# Patient Record
Sex: MALE | Race: WHITE | NOT HISPANIC OR LATINO | Employment: OTHER | URBAN - NONMETROPOLITAN AREA
[De-identification: names, ages, dates, MRNs, and addresses within clinical notes are randomized per-mention and may not be internally consistent; named-entity substitution may affect disease eponyms.]

---

## 2019-05-05 ENCOUNTER — APPOINTMENT (OUTPATIENT)
Dept: ULTRASOUND IMAGING | Facility: HOSPITAL | Age: 64
End: 2019-05-05

## 2019-05-05 ENCOUNTER — APPOINTMENT (OUTPATIENT)
Dept: GENERAL RADIOLOGY | Facility: HOSPITAL | Age: 64
End: 2019-05-05

## 2019-05-05 ENCOUNTER — HOSPITAL ENCOUNTER (EMERGENCY)
Facility: HOSPITAL | Age: 64
Discharge: HOME | End: 2019-05-05
Attending: EMERGENCY MEDICINE | Admitting: EMERGENCY MEDICINE

## 2019-05-05 ENCOUNTER — APPOINTMENT (OUTPATIENT)
Dept: CT IMAGING | Facility: HOSPITAL | Age: 64
End: 2019-05-05

## 2019-05-05 VITALS
OXYGEN SATURATION: 99 % | DIASTOLIC BLOOD PRESSURE: 91 MMHG | RESPIRATION RATE: 18 BRPM | HEIGHT: 67 IN | WEIGHT: 140 LBS | HEART RATE: 90 BPM | SYSTOLIC BLOOD PRESSURE: 127 MMHG | BODY MASS INDEX: 21.97 KG/M2 | TEMPERATURE: 97.8 F

## 2019-05-05 DIAGNOSIS — R19.7 DIARRHEA, UNSPECIFIED TYPE: Primary | ICD-10-CM

## 2019-05-05 DIAGNOSIS — M54.50 BILATERAL LOW BACK PAIN WITHOUT SCIATICA, UNSPECIFIED CHRONICITY: ICD-10-CM

## 2019-05-05 DIAGNOSIS — M79.604 ACUTE LEG PAIN, RIGHT: ICD-10-CM

## 2019-05-05 LAB
ALBUMIN SERPL-MCNC: 4.25 G/DL (ref 3.5–5.2)
ALBUMIN/GLOB SERPL: 1.4 G/DL
ALP SERPL-CCNC: 179 U/L (ref 39–117)
ALT SERPL W P-5'-P-CCNC: 20 U/L (ref 1–41)
ANION GAP SERPL CALCULATED.3IONS-SCNC: 15.3 MMOL/L
AST SERPL-CCNC: 18 U/L (ref 1–40)
BASOPHILS # BLD AUTO: 0.03 10*3/MM3 (ref 0–0.2)
BASOPHILS NFR BLD AUTO: 0.3 % (ref 0–1.5)
BILIRUB SERPL-MCNC: 0.9 MG/DL (ref 0.2–1.2)
BUN BLD-MCNC: 16 MG/DL (ref 8–23)
BUN/CREAT SERPL: 17.6 (ref 7–25)
CALCIUM SPEC-SCNC: 9.7 MG/DL (ref 8.6–10.5)
CHLORIDE SERPL-SCNC: 101 MMOL/L (ref 98–107)
CO2 SERPL-SCNC: 25.7 MMOL/L (ref 22–29)
CREAT BLD-MCNC: 0.91 MG/DL (ref 0.76–1.27)
DEPRECATED RDW RBC AUTO: 42.1 FL (ref 37–54)
EOSINOPHIL # BLD AUTO: 0.18 10*3/MM3 (ref 0–0.4)
EOSINOPHIL NFR BLD AUTO: 1.7 % (ref 0.3–6.2)
ERYTHROCYTE [DISTWIDTH] IN BLOOD BY AUTOMATED COUNT: 14.6 % (ref 12.3–15.4)
GFR SERPL CREATININE-BSD FRML MDRD: 84 ML/MIN/1.73
GLOBULIN UR ELPH-MCNC: 3 GM/DL
GLUCOSE BLD-MCNC: 150 MG/DL (ref 65–99)
HCT VFR BLD AUTO: 43.2 % (ref 37.5–51)
HGB BLD-MCNC: 14.6 G/DL (ref 13–17.7)
HOLD SPECIMEN: NORMAL
HOLD SPECIMEN: NORMAL
IMM GRANULOCYTES # BLD AUTO: 0.04 10*3/MM3 (ref 0–0.05)
IMM GRANULOCYTES NFR BLD AUTO: 0.4 % (ref 0–0.5)
LYMPHOCYTES # BLD AUTO: 1.18 10*3/MM3 (ref 0.7–3.1)
LYMPHOCYTES NFR BLD AUTO: 11.4 % (ref 19.6–45.3)
MCH RBC QN AUTO: 27.7 PG (ref 26.6–33)
MCHC RBC AUTO-ENTMCNC: 33.8 G/DL (ref 31.5–35.7)
MCV RBC AUTO: 81.8 FL (ref 79–97)
MONOCYTES # BLD AUTO: 0.68 10*3/MM3 (ref 0.1–0.9)
MONOCYTES NFR BLD AUTO: 6.6 % (ref 5–12)
NEUTROPHILS # BLD AUTO: 8.21 10*3/MM3 (ref 1.7–7)
NEUTROPHILS NFR BLD AUTO: 79.6 % (ref 42.7–76)
PLATELET # BLD AUTO: 147 10*3/MM3 (ref 140–450)
PMV BLD AUTO: 11.4 FL (ref 6–12)
POTASSIUM BLD-SCNC: 4 MMOL/L (ref 3.5–5.2)
PROT SERPL-MCNC: 7.2 G/DL (ref 6–8.5)
RBC # BLD AUTO: 5.28 10*6/MM3 (ref 4.14–5.8)
SODIUM BLD-SCNC: 142 MMOL/L (ref 136–145)
TROPONIN T SERPL-MCNC: <0.01 NG/ML (ref 0–0.03)
WBC NRBC COR # BLD: 10.32 10*3/MM3 (ref 3.4–10.8)
WHOLE BLOOD HOLD SPECIMEN: NORMAL
WHOLE BLOOD HOLD SPECIMEN: NORMAL

## 2019-05-05 PROCEDURE — 85025 COMPLETE CBC W/AUTO DIFF WBC: CPT | Performed by: PHYSICIAN ASSISTANT

## 2019-05-05 PROCEDURE — 99284 EMERGENCY DEPT VISIT MOD MDM: CPT

## 2019-05-05 PROCEDURE — 93971 EXTREMITY STUDY: CPT | Performed by: RADIOLOGY

## 2019-05-05 PROCEDURE — 71046 X-RAY EXAM CHEST 2 VIEWS: CPT

## 2019-05-05 PROCEDURE — 71046 X-RAY EXAM CHEST 2 VIEWS: CPT | Performed by: RADIOLOGY

## 2019-05-05 PROCEDURE — 93971 EXTREMITY STUDY: CPT

## 2019-05-05 PROCEDURE — 74177 CT ABD & PELVIS W/CONTRAST: CPT

## 2019-05-05 PROCEDURE — 93926 LOWER EXTREMITY STUDY: CPT | Performed by: RADIOLOGY

## 2019-05-05 PROCEDURE — 96360 HYDRATION IV INFUSION INIT: CPT

## 2019-05-05 PROCEDURE — 93926 LOWER EXTREMITY STUDY: CPT

## 2019-05-05 PROCEDURE — 80053 COMPREHEN METABOLIC PANEL: CPT | Performed by: PHYSICIAN ASSISTANT

## 2019-05-05 PROCEDURE — 74177 CT ABD & PELVIS W/CONTRAST: CPT | Performed by: RADIOLOGY

## 2019-05-05 PROCEDURE — 93010 ELECTROCARDIOGRAM REPORT: CPT | Performed by: INTERNAL MEDICINE

## 2019-05-05 PROCEDURE — 0 IOVERSOL 68 % SOLUTION: Performed by: EMERGENCY MEDICINE

## 2019-05-05 PROCEDURE — 93005 ELECTROCARDIOGRAM TRACING: CPT | Performed by: PHYSICIAN ASSISTANT

## 2019-05-05 PROCEDURE — 84484 ASSAY OF TROPONIN QUANT: CPT | Performed by: PHYSICIAN ASSISTANT

## 2019-05-05 RX ORDER — CARVEDILOL 25 MG/1
25 TABLET ORAL 2 TIMES DAILY WITH MEALS
Status: ON HOLD | COMMUNITY
End: 2019-07-10

## 2019-05-05 RX ORDER — GLYBURIDE 2.5 MG/1
2.5 TABLET ORAL
Status: ON HOLD | COMMUNITY
End: 2019-07-10

## 2019-05-05 RX ORDER — LISINOPRIL 20 MG/1
20 TABLET ORAL DAILY
Status: ON HOLD | COMMUNITY
End: 2019-07-10

## 2019-05-05 RX ORDER — CETIRIZINE HYDROCHLORIDE 10 MG/1
10 TABLET ORAL DAILY
Status: ON HOLD | COMMUNITY
End: 2019-07-10

## 2019-05-05 RX ORDER — FUROSEMIDE 20 MG/1
20 TABLET ORAL 2 TIMES DAILY
Status: ON HOLD | COMMUNITY
End: 2019-07-10

## 2019-05-05 RX ORDER — SODIUM CHLORIDE 0.9 % (FLUSH) 0.9 %
10 SYRINGE (ML) INJECTION AS NEEDED
Status: DISCONTINUED | OUTPATIENT
Start: 2019-05-05 | End: 2019-05-05 | Stop reason: HOSPADM

## 2019-05-05 RX ORDER — SPIRONOLACTONE 25 MG/1
12.5 TABLET ORAL DAILY
Status: ON HOLD | COMMUNITY
End: 2019-07-10

## 2019-05-05 RX ORDER — ASPIRIN 81 MG/1
81 TABLET, CHEWABLE ORAL DAILY
Status: ON HOLD | COMMUNITY
End: 2019-07-10

## 2019-05-05 RX ORDER — ONDANSETRON 4 MG/1
4 TABLET, ORALLY DISINTEGRATING ORAL EVERY 6 HOURS PRN
Qty: 20 TABLET | Refills: 0 | Status: ON HOLD | OUTPATIENT
Start: 2019-05-05 | End: 2019-07-10

## 2019-05-05 RX ORDER — ATORVASTATIN CALCIUM 40 MG/1
20 TABLET, FILM COATED ORAL DAILY
Status: ON HOLD | COMMUNITY
End: 2019-07-10

## 2019-05-05 RX ORDER — ISOSORBIDE MONONITRATE 30 MG/1
30 TABLET, EXTENDED RELEASE ORAL DAILY
Status: ON HOLD | COMMUNITY
End: 2019-07-10

## 2019-05-05 RX ORDER — PANTOPRAZOLE SODIUM 40 MG/1
40 TABLET, DELAYED RELEASE ORAL DAILY
Status: ON HOLD | COMMUNITY
End: 2019-07-10

## 2019-05-05 RX ORDER — NITROGLYCERIN 0.4 MG/1
0.4 TABLET SUBLINGUAL
Status: ON HOLD | COMMUNITY
End: 2019-07-10

## 2019-05-05 RX ADMIN — IOVERSOL 100 ML: 678 INJECTION INTRA-ARTERIAL; INTRAVENOUS at 15:05

## 2019-05-05 RX ADMIN — SODIUM CHLORIDE 1000 ML: 9 INJECTION, SOLUTION INTRAVENOUS at 13:03

## 2019-05-05 NOTE — ED NOTES
Pt felt he was going to have bowel movement put was unable to go. Provider aware.     Kathrin Wilson, RN  05/05/19 1600

## 2019-05-05 NOTE — ED NOTES
Called to request medical records from [facility].     Kathrin Wilson, RN  05/05/19 1372

## 2019-05-05 NOTE — ED PROVIDER NOTES
Subjective   This is a 63-year-old male who comes in with multiple complaints from home.  Patient presents complaining of weakness, fatigue, SOA, lumbar back pain, abdominal pain and diarrhea.  Patient also reports having right lower extremity pain.  Patient does state he has had several episodes of runny diarrhea over the past week.  Has had intermittent sharp, stabbing lumbar back pain that radiates to his lower abdomen.  Patient does have significant history of CHF, hypertension, cardiac disease with 5 previous cardiac stents placed, pneumonia.        History provided by:  Patient   used: No    Back Pain   Location:  Lumbar spine  Quality:  Aching  Pain severity:  No pain  Onset quality:  Sudden  Duration:  1 day  Timing:  Intermittent  Progression:  Worsening  Chronicity:  New  Relieved by:  Nothing  Worsened by:  Nothing  Ineffective treatments:  None tried  Associated symptoms: abdominal pain, leg pain and weakness    Associated symptoms: no bladder incontinence, no bowel incontinence, no dysuria and no perianal numbness    Risk factors: no hx of cancer, no hx of osteoporosis, no menopause, not obese, no recent surgery and no steroid use    Diarrhea   The primary symptoms include abdominal pain, nausea and diarrhea. Primary symptoms do not include dysuria or myalgias.   The illness is also significant for back pain. The illness does not include chills, anorexia, odynophagia or bloating. Associated medical issues do not include GERD, gallstones, alcohol abuse, gastric bypass, bowel resection, irritable bowel syndrome or hemorrhoids.       Review of Systems   Constitutional: Negative for chills.   Respiratory: Negative.    Gastrointestinal: Positive for abdominal distention, abdominal pain, diarrhea and nausea. Negative for anorexia, bloating and bowel incontinence.   Genitourinary: Negative.  Negative for bladder incontinence, difficulty urinating, dysuria, enuresis, flank pain and genital  sores.   Musculoskeletal: Positive for back pain. Negative for gait problem, joint swelling and myalgias.   Neurological: Positive for weakness.   Hematological: Negative for adenopathy. Does not bruise/bleed easily.   All other systems reviewed and are negative.      Past Medical History:   Diagnosis Date   • CHF (congestive heart failure) (CMS/HCC)    • GERD (gastroesophageal reflux disease)    • Hyperlipidemia    • Hypertension    • Myocardial infarction (CMS/HCC)    • Pneumonia        No Known Allergies    Past Surgical History:   Procedure Laterality Date   • CARDIAC CATHETERIZATION     • FOOT SURGERY     • PACEMAKER IMPLANTATION     • SHOULDER SURGERY         History reviewed. No pertinent family history.    Social History     Socioeconomic History   • Marital status:      Spouse name: Not on file   • Number of children: Not on file   • Years of education: Not on file   • Highest education level: Not on file   Tobacco Use   • Smoking status: Never Smoker   • Smokeless tobacco: Never Used   Substance and Sexual Activity   • Alcohol use: No     Frequency: Never   • Drug use: No           Objective   Physical Exam   Constitutional: He is oriented to person, place, and time. He appears well-developed and well-nourished. No distress.   HENT:   Head: Normocephalic.   Right Ear: External ear normal.   Left Ear: External ear normal.   Nose: Nose normal.   Mouth/Throat: Oropharynx is clear and moist. No oropharyngeal exudate.   Eyes: Conjunctivae and EOM are normal. Pupils are equal, round, and reactive to light. Right eye exhibits no discharge. Left eye exhibits no discharge. No scleral icterus.   Neck: Normal range of motion. Neck supple. No JVD present. No tracheal deviation present. No thyromegaly present.   Cardiovascular: Normal rate, regular rhythm, normal heart sounds and intact distal pulses. Exam reveals no gallop and no friction rub.   No murmur heard.  Pulmonary/Chest: Effort normal and breath sounds  normal. No stridor. No respiratory distress. He has no wheezes. He has no rales. He exhibits no tenderness.   Abdominal: Soft. Bowel sounds are normal. He exhibits no distension and no mass. There is no tenderness. There is no rebound and no guarding.   Musculoskeletal: Normal range of motion. He exhibits no edema, tenderness or deformity.   Lymphadenopathy:     He has no cervical adenopathy.   Neurological: He is alert and oriented to person, place, and time. He displays normal reflexes. No cranial nerve deficit or sensory deficit. He exhibits normal muscle tone. Coordination normal.   Skin: Skin is warm and dry. Capillary refill takes less than 2 seconds. No rash noted. He is not diaphoretic. No erythema. No pallor.   Psychiatric: He has a normal mood and affect. His behavior is normal. Judgment and thought content normal.   Nursing note and vitals reviewed.      Procedures           ED Course                  MDM      Final diagnoses:   Diarrhea, unspecified type   Bilateral low back pain without sciatica, unspecified chronicity   Acute leg pain, right            Mark Campuzano PA-C  05/05/19 5916

## 2019-07-10 ENCOUNTER — APPOINTMENT (OUTPATIENT)
Dept: GENERAL RADIOLOGY | Facility: HOSPITAL | Age: 64
End: 2019-07-10

## 2019-07-10 ENCOUNTER — HOSPITAL ENCOUNTER (OUTPATIENT)
Facility: HOSPITAL | Age: 64
Setting detail: OBSERVATION
Discharge: HOME | End: 2019-07-12
Attending: EMERGENCY MEDICINE | Admitting: INTERNAL MEDICINE

## 2019-07-10 DIAGNOSIS — R07.9 CHEST PAIN, UNSPECIFIED TYPE: Primary | ICD-10-CM

## 2019-07-10 DIAGNOSIS — K59.00 CONSTIPATION, UNSPECIFIED CONSTIPATION TYPE: ICD-10-CM

## 2019-07-10 DIAGNOSIS — K56.41 FECAL IMPACTION (HCC): ICD-10-CM

## 2019-07-10 DIAGNOSIS — R53.81 DEBILITY: ICD-10-CM

## 2019-07-10 LAB
6-ACETYL MORPHINE: NEGATIVE
ALBUMIN SERPL-MCNC: 3.34 G/DL (ref 3.5–5.2)
ALBUMIN/GLOB SERPL: 1.1 G/DL
ALP SERPL-CCNC: 101 U/L (ref 39–117)
ALT SERPL W P-5'-P-CCNC: 7 U/L (ref 1–41)
AMPHET+METHAMPHET UR QL: NEGATIVE
ANION GAP SERPL CALCULATED.3IONS-SCNC: 15.5 MMOL/L (ref 5–15)
AST SERPL-CCNC: 16 U/L (ref 1–40)
BARBITURATES UR QL SCN: NEGATIVE
BASOPHILS # BLD AUTO: 0.05 10*3/MM3 (ref 0–0.2)
BASOPHILS NFR BLD AUTO: 0.5 % (ref 0–1.5)
BENZODIAZ UR QL SCN: NEGATIVE
BILIRUB SERPL-MCNC: 0.9 MG/DL (ref 0.2–1.2)
BILIRUB UR QL STRIP: ABNORMAL
BUN BLD-MCNC: 14 MG/DL (ref 8–23)
BUN/CREAT SERPL: 18.7 (ref 7–25)
BUPRENORPHINE SERPL-MCNC: NEGATIVE NG/ML
CALCIUM SPEC-SCNC: 9.2 MG/DL (ref 8.6–10.5)
CANNABINOIDS SERPL QL: NEGATIVE
CHLORIDE SERPL-SCNC: 105 MMOL/L (ref 98–107)
CLARITY UR: CLEAR
CO2 SERPL-SCNC: 23.5 MMOL/L (ref 22–29)
COCAINE UR QL: NEGATIVE
COLOR UR: ABNORMAL
CREAT BLD-MCNC: 0.75 MG/DL (ref 0.76–1.27)
DEPRECATED RDW RBC AUTO: 47.6 FL (ref 37–54)
EOSINOPHIL # BLD AUTO: 0.07 10*3/MM3 (ref 0–0.4)
EOSINOPHIL NFR BLD AUTO: 0.8 % (ref 0.3–6.2)
ERYTHROCYTE [DISTWIDTH] IN BLOOD BY AUTOMATED COUNT: 15.2 % (ref 12.3–15.4)
GFR SERPL CREATININE-BSD FRML MDRD: 105 ML/MIN/1.73
GLOBULIN UR ELPH-MCNC: 3.1 GM/DL
GLUCOSE BLD-MCNC: 81 MG/DL (ref 65–99)
GLUCOSE BLDC GLUCOMTR-MCNC: 55 MG/DL (ref 70–130)
GLUCOSE BLDC GLUCOMTR-MCNC: 86 MG/DL (ref 70–130)
GLUCOSE UR STRIP-MCNC: NEGATIVE MG/DL
HBA1C MFR BLD: 5.2 % (ref 4.8–5.6)
HCT VFR BLD AUTO: 40.3 % (ref 37.5–51)
HGB BLD-MCNC: 12.9 G/DL (ref 13–17.7)
HGB UR QL STRIP.AUTO: NEGATIVE
IMM GRANULOCYTES # BLD AUTO: 0.03 10*3/MM3 (ref 0–0.05)
IMM GRANULOCYTES NFR BLD AUTO: 0.3 % (ref 0–0.5)
KETONES UR QL STRIP: ABNORMAL
LEUKOCYTE ESTERASE UR QL STRIP.AUTO: NEGATIVE
LIPASE SERPL-CCNC: 47 U/L (ref 13–60)
LYMPHOCYTES # BLD AUTO: 1.18 10*3/MM3 (ref 0.7–3.1)
LYMPHOCYTES NFR BLD AUTO: 13 % (ref 19.6–45.3)
MAGNESIUM SERPL-MCNC: 1.8 MG/DL (ref 1.6–2.4)
MCH RBC QN AUTO: 27.7 PG (ref 26.6–33)
MCHC RBC AUTO-ENTMCNC: 32 G/DL (ref 31.5–35.7)
MCV RBC AUTO: 86.5 FL (ref 79–97)
METHADONE UR QL SCN: NEGATIVE
MONOCYTES # BLD AUTO: 0.56 10*3/MM3 (ref 0.1–0.9)
MONOCYTES NFR BLD AUTO: 6.2 % (ref 5–12)
NEUTROPHILS # BLD AUTO: 7.21 10*3/MM3 (ref 1.7–7)
NEUTROPHILS NFR BLD AUTO: 79.2 % (ref 42.7–76)
NITRITE UR QL STRIP: NEGATIVE
NT-PROBNP SERPL-MCNC: 2289 PG/ML (ref 5–900)
OPIATES UR QL: NEGATIVE
OXYCODONE UR QL SCN: NEGATIVE
PCP UR QL SCN: NEGATIVE
PH UR STRIP.AUTO: <=5 [PH] (ref 5–8)
PLATELET # BLD AUTO: 233 10*3/MM3 (ref 140–450)
PMV BLD AUTO: 10.9 FL (ref 6–12)
POTASSIUM BLD-SCNC: 3.3 MMOL/L (ref 3.5–5.2)
PROT SERPL-MCNC: 6.4 G/DL (ref 6–8.5)
PROT UR QL STRIP: NEGATIVE
RBC # BLD AUTO: 4.66 10*6/MM3 (ref 4.14–5.8)
SODIUM BLD-SCNC: 144 MMOL/L (ref 136–145)
SP GR UR STRIP: 1.02 (ref 1–1.03)
TROPONIN T SERPL-MCNC: <0.01 NG/ML (ref 0–0.03)
TROPONIN T SERPL-MCNC: <0.01 NG/ML (ref 0–0.03)
TSH SERPL DL<=0.05 MIU/L-ACNC: 0.96 MIU/ML (ref 0.27–4.2)
UROBILINOGEN UR QL STRIP: ABNORMAL
WBC NRBC COR # BLD: 9.1 10*3/MM3 (ref 3.4–10.8)

## 2019-07-10 PROCEDURE — 85025 COMPLETE CBC W/AUTO DIFF WBC: CPT | Performed by: PHYSICIAN ASSISTANT

## 2019-07-10 PROCEDURE — 74022 RADEX COMPL AQT ABD SERIES: CPT

## 2019-07-10 PROCEDURE — 99219 PR INITIAL OBSERVATION CARE/DAY 50 MINUTES: CPT | Performed by: PHYSICIAN ASSISTANT

## 2019-07-10 PROCEDURE — 82962 GLUCOSE BLOOD TEST: CPT

## 2019-07-10 PROCEDURE — 80307 DRUG TEST PRSMV CHEM ANLYZR: CPT | Performed by: PHYSICIAN ASSISTANT

## 2019-07-10 PROCEDURE — 99285 EMERGENCY DEPT VISIT HI MDM: CPT

## 2019-07-10 PROCEDURE — 83690 ASSAY OF LIPASE: CPT | Performed by: PHYSICIAN ASSISTANT

## 2019-07-10 PROCEDURE — 74022 RADEX COMPL AQT ABD SERIES: CPT | Performed by: RADIOLOGY

## 2019-07-10 PROCEDURE — 80053 COMPREHEN METABOLIC PANEL: CPT | Performed by: PHYSICIAN ASSISTANT

## 2019-07-10 PROCEDURE — 81003 URINALYSIS AUTO W/O SCOPE: CPT | Performed by: PHYSICIAN ASSISTANT

## 2019-07-10 PROCEDURE — 84443 ASSAY THYROID STIM HORMONE: CPT | Performed by: PHYSICIAN ASSISTANT

## 2019-07-10 PROCEDURE — 84484 ASSAY OF TROPONIN QUANT: CPT | Performed by: PHYSICIAN ASSISTANT

## 2019-07-10 PROCEDURE — 83880 ASSAY OF NATRIURETIC PEPTIDE: CPT | Performed by: PHYSICIAN ASSISTANT

## 2019-07-10 PROCEDURE — 93005 ELECTROCARDIOGRAM TRACING: CPT | Performed by: PHYSICIAN ASSISTANT

## 2019-07-10 PROCEDURE — 93010 ELECTROCARDIOGRAM REPORT: CPT | Performed by: INTERNAL MEDICINE

## 2019-07-10 PROCEDURE — G0378 HOSPITAL OBSERVATION PER HR: HCPCS

## 2019-07-10 PROCEDURE — 83036 HEMOGLOBIN GLYCOSYLATED A1C: CPT | Performed by: PHYSICIAN ASSISTANT

## 2019-07-10 PROCEDURE — 83735 ASSAY OF MAGNESIUM: CPT | Performed by: PHYSICIAN ASSISTANT

## 2019-07-10 RX ORDER — SODIUM CHLORIDE 9 MG/ML
50 INJECTION, SOLUTION INTRAVENOUS CONTINUOUS
Status: DISCONTINUED | OUTPATIENT
Start: 2019-07-10 | End: 2019-07-12 | Stop reason: HOSPADM

## 2019-07-10 RX ORDER — HEPARIN SODIUM 5000 [USP'U]/ML
5000 INJECTION, SOLUTION INTRAVENOUS; SUBCUTANEOUS EVERY 8 HOURS SCHEDULED
Status: CANCELLED | OUTPATIENT
Start: 2019-07-10

## 2019-07-10 RX ORDER — POTASSIUM CHLORIDE 20 MEQ/1
40 TABLET, EXTENDED RELEASE ORAL EVERY 4 HOURS
Status: COMPLETED | OUTPATIENT
Start: 2019-07-10 | End: 2019-07-10

## 2019-07-10 RX ORDER — CARVEDILOL 3.12 MG/1
3.12 TABLET ORAL 2 TIMES DAILY WITH MEALS
COMMUNITY
End: 2019-07-12 | Stop reason: HOSPADM

## 2019-07-10 RX ORDER — NICOTINE POLACRILEX 4 MG
15 LOZENGE BUCCAL
Status: DISCONTINUED | OUTPATIENT
Start: 2019-07-10 | End: 2019-07-12 | Stop reason: HOSPADM

## 2019-07-10 RX ORDER — POTASSIUM CHLORIDE 750 MG/1
40 CAPSULE, EXTENDED RELEASE ORAL AS NEEDED
Status: DISCONTINUED | OUTPATIENT
Start: 2019-07-10 | End: 2019-07-12 | Stop reason: HOSPADM

## 2019-07-10 RX ORDER — ASPIRIN 81 MG/1
324 TABLET, CHEWABLE ORAL ONCE
Status: COMPLETED | OUTPATIENT
Start: 2019-07-10 | End: 2019-07-10

## 2019-07-10 RX ORDER — LISINOPRIL 5 MG/1
5 TABLET ORAL DAILY
COMMUNITY
End: 2019-07-12 | Stop reason: HOSPADM

## 2019-07-10 RX ORDER — POTASSIUM CHLORIDE 1.5 G/1.77G
40 POWDER, FOR SOLUTION ORAL AS NEEDED
Status: DISCONTINUED | OUTPATIENT
Start: 2019-07-10 | End: 2019-07-12 | Stop reason: HOSPADM

## 2019-07-10 RX ORDER — DEXTROSE MONOHYDRATE 25 G/50ML
25 INJECTION, SOLUTION INTRAVENOUS
Status: DISCONTINUED | OUTPATIENT
Start: 2019-07-10 | End: 2019-07-12 | Stop reason: HOSPADM

## 2019-07-10 RX ORDER — SODIUM CHLORIDE 0.9 % (FLUSH) 0.9 %
10 SYRINGE (ML) INJECTION AS NEEDED
Status: DISCONTINUED | OUTPATIENT
Start: 2019-07-10 | End: 2019-07-12 | Stop reason: HOSPADM

## 2019-07-10 RX ORDER — SENNA AND DOCUSATE SODIUM 50; 8.6 MG/1; MG/1
2 TABLET, FILM COATED ORAL NIGHTLY
Status: DISCONTINUED | OUTPATIENT
Start: 2019-07-10 | End: 2019-07-12 | Stop reason: HOSPADM

## 2019-07-10 RX ORDER — BISACODYL 10 MG
10 SUPPOSITORY, RECTAL RECTAL DAILY PRN
Status: DISCONTINUED | OUTPATIENT
Start: 2019-07-10 | End: 2019-07-12 | Stop reason: HOSPADM

## 2019-07-10 RX ORDER — ATORVASTATIN CALCIUM 20 MG/1
20 TABLET, FILM COATED ORAL NIGHTLY
Status: DISCONTINUED | OUTPATIENT
Start: 2019-07-10 | End: 2019-07-12 | Stop reason: HOSPADM

## 2019-07-10 RX ORDER — SODIUM CHLORIDE 0.9 % (FLUSH) 0.9 %
3-10 SYRINGE (ML) INJECTION AS NEEDED
Status: DISCONTINUED | OUTPATIENT
Start: 2019-07-10 | End: 2019-07-12 | Stop reason: HOSPADM

## 2019-07-10 RX ORDER — SODIUM CHLORIDE 0.9 % (FLUSH) 0.9 %
3 SYRINGE (ML) INJECTION EVERY 12 HOURS SCHEDULED
Status: DISCONTINUED | OUTPATIENT
Start: 2019-07-10 | End: 2019-07-12 | Stop reason: HOSPADM

## 2019-07-10 RX ADMIN — SENNOSIDES AND DOCUSATE SODIUM 2 TABLET: 8.6; 5 TABLET ORAL at 18:43

## 2019-07-10 RX ADMIN — POTASSIUM CHLORIDE 40 MEQ: 1500 TABLET, EXTENDED RELEASE ORAL at 18:44

## 2019-07-10 RX ADMIN — SODIUM CHLORIDE 50 ML/HR: 9 INJECTION, SOLUTION INTRAVENOUS at 18:16

## 2019-07-10 RX ADMIN — POTASSIUM CHLORIDE 40 MEQ: 1500 TABLET, EXTENDED RELEASE ORAL at 21:35

## 2019-07-10 RX ADMIN — APIXABAN 5 MG: 5 TABLET, FILM COATED ORAL at 21:35

## 2019-07-10 RX ADMIN — POLYETHYLENE GLYCOL (3350) 17 G: 17 POWDER, FOR SOLUTION ORAL at 18:43

## 2019-07-10 RX ADMIN — ASPIRIN 324 MG: 81 TABLET, CHEWABLE ORAL at 15:55

## 2019-07-10 RX ADMIN — ATORVASTATIN CALCIUM 20 MG: 20 TABLET, FILM COATED ORAL at 21:35

## 2019-07-10 RX ADMIN — SODIUM CHLORIDE 500 ML: 9 INJECTION, SOLUTION INTRAVENOUS at 12:54

## 2019-07-10 NOTE — ED PROVIDER NOTES
"Subjective   63-year-old male patient presents to the emergency room today with complaints of hypotension, shortness of breath, abdominal pain, and constipation.  Patient states that Dr. Michealts him to the ER related to his systolic blood pressure being in the 80s at his office this morning.  Patient states that he has been constipated for over a week.  States that last week he was seen at [facility] ER and was told that he was \"impacted.\"  Patient was discharged home with enemas.  Patient states that he is still not had a bowel movement.  States that he has been trying to digitally disimpact himself at home but having no progress.  Patient states that he has not been able to eat or drink anything and has not been taking his medication due to the abdominal discomfort and constipation.  States that everything that he eats or drinks makes him sick.  He denies having any chest pain.  Denies having any fevers at home.  He denies having any nausea or vomiting.        History provided by:  Patient   used: No        Review of Systems   Constitutional: Negative.    HENT: Negative.    Eyes: Negative.    Respiratory: Positive for shortness of breath.    Cardiovascular: Negative.    Gastrointestinal: Positive for abdominal pain and constipation.   Endocrine: Negative.    Genitourinary: Negative.    Musculoskeletal: Negative.    Skin: Negative.    Allergic/Immunologic: Negative.    Neurological: Negative.    Hematological: Negative.    Psychiatric/Behavioral: Negative.    All other systems reviewed and are negative.      Past Medical History:   Diagnosis Date   • CHF (congestive heart failure) (CMS/HCC)    • GERD (gastroesophageal reflux disease)    • Hyperlipidemia    • Hypertension    • Myocardial infarction (CMS/[facility])    • Pneumonia        No Known Allergies    Past Surgical History:   Procedure Laterality Date   • CARDIAC CATHETERIZATION     • FOOT SURGERY     • PACEMAKER IMPLANTATION     • SHOULDER " SURGERY         No family history on file.    Social History     Socioeconomic History   • Marital status:      Spouse name: Not on file   • Number of children: Not on file   • Years of education: Not on file   • Highest education level: Not on file   Tobacco Use   • Smoking status: Never Smoker   • Smokeless tobacco: Never Used   Substance and Sexual Activity   • Alcohol use: No     Frequency: Never   • Drug use: No           Objective   Physical Exam   Constitutional: He is oriented to person, place, and time. He appears well-developed and well-nourished.   HENT:   Head: Normocephalic and atraumatic.   Right Ear: External ear normal.   Left Ear: External ear normal.   Nose: Nose normal.   Mouth/Throat: Oropharynx is clear and moist.   Eyes: Conjunctivae and EOM are normal. Pupils are equal, round, and reactive to light.   Neck: Normal range of motion. Neck supple.   Cardiovascular: Normal rate, regular rhythm, normal heart sounds and intact distal pulses.   Pulmonary/Chest: Effort normal and breath sounds normal.   Abdominal: Soft. Bowel sounds are normal. There is tenderness.   Musculoskeletal: Normal range of motion.   Neurological: He is alert and oriented to person, place, and time.   Skin: Skin is warm and dry.   Nursing note and vitals reviewed.      Procedures           ED Course  ED Course as of Jul 10 1627   Wed Jul 10, 2019   1057 1050- Reviewed by Dr. Rubio, rate 65, sinus rhythm, no significant change 5/5/19.   ECG 12 Lead [ML]   1302 Fecal impaction on exam.    [ML]   1302 Pt disimpacted. Patient was able to have a large BM.    [ML]   1339 Discussed case with Dr. Whiting.  Does not recommend anything further.  [ML]   1353 Pt states that he is now having chest pain. New orders placed.   [ML]   1403 1357- Reviewed by Dr. Rubio, rate 55, no change from previous  ECG 12 Lead [ML]   1452 D/W Karina with observation recommends contacting hospitalist group.   [ML]   1508 D/W Dr. Early will accept  the patient for observation.   [ML]   1509 HEART SCORE 4  [ML]      ED Course User Index  [ML] Trinidad Roa PA                  [facility]      Final diagnoses:   Chest pain, unspecified type   Constipation, unspecified constipation type   Fecal impaction (CMS/HCC)            Trinidad Roa PA  07/10/19 5217

## 2019-07-10 NOTE — PLAN OF CARE
Problem: Patient Care Overview  Goal: Plan of Care Review  Outcome: Ongoing (interventions implemented as appropriate)    Goal: Individualization and Mutuality  Outcome: Ongoing (interventions implemented as appropriate)    Goal: Discharge Needs Assessment  Outcome: Ongoing (interventions implemented as appropriate)    Goal: Interprofessional Rounds/Family Conf  Outcome: Ongoing (interventions implemented as appropriate)      Problem: Skin Injury Risk (Adult)  Goal: Identify Related Risk Factors and Signs and Symptoms  Outcome: Ongoing (interventions implemented as appropriate)    Goal: Skin Health and Integrity  Outcome: Ongoing (interventions implemented as appropriate)      Problem: Nausea/Vomiting (Adult)  Goal: Identify Related Risk Factors and Signs and Symptoms  Outcome: Ongoing (interventions implemented as appropriate)    Goal: Symptom Relief  Outcome: Ongoing (interventions implemented as appropriate)    Goal: Adequate Hydration  Outcome: Ongoing (interventions implemented as appropriate)

## 2019-07-10 NOTE — H&P
[facility] HOSPITALIST HISTORY AND PHYSICAL    Patient Identification:  Name:  Sachin Rubio  Age:  63 y.o.  Sex:  male  :  1955  MRN:  [id number]   Visit Number:  [id number]  Primary Care Physician:  John Rios MD     7/10/2019 10:18 AM    Subjective     Chief complaint:   Chief Complaint   Patient presents with   • Hypotension   • Constipation     History of presenting illness:   Mr. Rubio is a 63 y.o. male with past medical history significant for advanced ischemic cardiomyopathy s/p AICD implantation about 4-6 years ago, CAD s/p stenting x5, hypertension, dyslipidemia, and type 2 diabetes mellitus.  He follows with Dr. Alberts at [facility] for his cardiac care. He presented to the emergency department of [facility] on 7/10/2019 with complaints of constipation and low BP.  He went to see his PCP, Dr. Rios, this morning for routine follow-up.  He was noted to be hypotensive with reported systolic blood pressure in the 80s.  He was instructed to come to the ED for further evaluation.  He denies any recent vomiting, but he has had nausea and indigestion.  He has had several weeks of generalized weakness, fatigue, and severe constipation. He doesn't take any pain medications at home. He reports being admitted to [facility] for 4 days a few weeks ago where he was diagnosed with acute bronchitis. He was also having issues with his bowels at that time.  At discharge, he was set up with home health with physical therapy, which had not started yet. Since then, he returned to [place] ER about 1 week ago with complaints of constipation.  He was given a dose of lactulose and prescribed some enemas, which he apparently did not fill.  He was taking some Lasix regularly, but this was discontinued about 2 weeks ago.     Upon arrival to the ED, vitals were BP 93/64, respirations 18, heart rate 84, SPO2 97% on room air, afebrile.  proBNP is 2289.  " Potassium 3.3.  WBC count is within normal limits.  UA shows dark urine with 1+ ketones and 1+ bilirubin.  Urine toxicology screen is unremarkable.  He had an acute abdominal series which showed atelectasis of the left lower lung.  There is moderate constipation with no evidence of bowel obstruction.  While in the ED, he developed complaints of chest discomfort.  He reports that he \"cannot describe it\".  It was located in the central chest.  No radiation to the arm, neck, or back.  It resolved quickly on its own.  He has not been having chest pain at home recently.  He has noted shortness of breath with mild to moderate exertion.  His wife reports generalized weakness for the last month.  No pedal edema.  No cough, fever, or chills. Patient has been admitted to the observation unit of [facility] for further evaluation and therapy.   ---------------------------------------------------------------------------------------------------------------------   Review of Systems   Constitutional: Positive for activity change, appetite change and fatigue. Negative for chills, diaphoresis and fever.   HENT: Negative for congestion, nosebleeds and postnasal drip.    Respiratory: Positive for shortness of breath. Negative for cough and wheezing.    Cardiovascular: Positive for chest pain. Negative for palpitations and leg swelling.   Gastrointestinal: Positive for abdominal distention, abdominal pain (Generalized discomfort and indigestion. ), constipation and nausea. Negative for diarrhea and vomiting.   Genitourinary: Negative for difficulty urinating, dysuria and hematuria.   Musculoskeletal: Positive for gait problem (Unsteady. ).   Skin: Negative for color change, pallor and rash.   Neurological: Positive for weakness (Generalized. ). Negative for dizziness and syncope.   Psychiatric/Behavioral: Negative for agitation, behavioral problems and confusion.    "   ---------------------------------------------------------------------------------------------------------------------   Past Medical History:   Diagnosis Date   • CHF (congestive heart failure) (CMS/[facility])    • GERD (gastroesophageal reflux disease)    • Hyperlipidemia    • Hypertension    • Myocardial infarction (CMS/[facility])    • Pneumonia      Past Surgical History:   Procedure Laterality Date   • CARDIAC CATHETERIZATION     • FOOT SURGERY     • PACEMAKER IMPLANTATION     • SHOULDER SURGERY       No family history on file.  Social History     Socioeconomic History   • Marital status:      Spouse name: Not on file   • Number of children: Not on file   • Years of education: Not on file   • Highest education level: Not on file   Tobacco Use   • Smoking status: Never Smoker   • Smokeless tobacco: Never Used   Substance and Sexual Activity   • Alcohol use: No     Frequency: Never   • Drug use: No     ---------------------------------------------------------------------------------------------------------------------   Allergies:  Patient has no known allergies.  ---------------------------------------------------------------------------------------------------------------------   Prior to Admission Medications     Prescriptions Last Dose Informant Patient Reported? Taking?    apixaban (ELIQUIS) 5 MG tablet tablet   Yes No    Take 5 mg by mouth 2 (Two) Times a Day.    aspirin 81 MG chewable tablet   Yes No    Chew 81 mg Daily.    atorvastatin (LIPITOR) 40 MG tablet   Yes No    Take 20 mg by mouth Daily.    carvedilol (COREG) 25 MG tablet   Yes No    Take 25 mg by mouth 2 (Two) Times a Day With Meals.    cetirizine (zyrTEC) 10 MG tablet   Yes No    Take 10 mg by mouth Daily.    furosemide (LASIX) 20 MG tablet   Yes No    Take 20 mg by mouth 2 (Two) Times a Day.    glyBURIDE (DIAbeta) 2.5 MG tablet   Yes No    Take 2.5 mg by mouth Daily With Breakfast.    isosorbide mononitrate (IMDUR) 30 MG 24 hr tablet   Yes No     Take 30 mg by mouth Daily.    lisinopril (PRINIVIL,ZESTRIL) 20 MG tablet   Yes No    Take 20 mg by mouth Daily.    nitroglycerin (NITROSTAT) 0.4 MG SL tablet   Yes No    Place 0.4 mg under the tongue Every 5 (Five) Minutes As Needed for Chest Pain. Take no more than 3 doses in 15 minutes.    ondansetron ODT (ZOFRAN-ODT) 4 MG disintegrating tablet   No No    Take 1 tablet by mouth Every 6 (Six) Hours As Needed for Nausea.    pantoprazole (PROTONIX) 40 MG EC tablet   Yes No    Take 40 mg by mouth Daily.    Probiotic Product (DIGESTIVE ADVANTAGE PO)   Yes No    Take  by mouth.    spironolactone (ALDACTONE) 25 MG tablet   Yes No    Take 12.5 mg by mouth Daily.        ---------------------------------------------------------------------------------------------------------------------  Objective   Hospital Scheduled Meds:    aspirin 324 mg Oral Once        ---------------------------------------------------------------------------------------------------------------------   Vital Signs:  Temp:  [98.2 °F (36.8 °C)] 98.2 °F (36.8 °C)  Heart Rate:  [84] 84  Resp:  [18] 18  BP: (93)/(64) 93/64  No data found.  SpO2 Percentage    07/10/19 1004   SpO2: 97%     SpO2:  [97 %] 97 %  on   ;   Device (Oxygen Therapy): room air    Body mass index is 18.52 kg/m².  Wt Readings from Last 3 Encounters:   07/10/19 54.4 kg (120 lb)   05/05/19 63.5 kg (140 lb)   08/29/14 78.5 kg (172 lb 15.9 oz)   ---------------------------------------------------------------------------------------------------------------------   Physical Exam:  Constitutional: Chronically ill appearing, thin  male, resting comfortably in bed. No acute distress on room air.    HENT:  Head: Normocephalic and atraumatic.  Mouth: Dry with peeling skin on his lips.  Eyes:  Conjunctivae and EOM are normal. No scleral icterus. No erythema or drainage.  Neck:  Neck supple.  No JVD present. No carotid bruits noted.   Cardiovascular:  Normal rate, regular rhythm and  normal heart sounds with no murmur.  Pulmonary/Chest:  No respiratory distress, no wheezes. Few crackles at the left base.  Abdominal:  Soft.  Bowel sounds are present x4.  No distension and no tenderness.   Musculoskeletal:  Thin, no edema, no tenderness, and no deformity.  No red or swollen joints anywhere.    Neurological:  Alert and oriented to person, place, and time.  No cranial nerve deficit.  No tongue deviation.  No facial droop.  No slurred speech.   Skin:  Skin is warm and dry.  No rash noted.  No pallor.   Psychiatric:  Normal mood and affect.  Behavior is normal.  Judgment and thought content normal.   Peripheral vascular:  No edema and 2+ pedal pulses bilaterally.   Genitourinary: No marie catheter in place.   ---------------------------------------------------------------------------------------------------------------------  EKG:  Pending cardiology interpretation. Per my read, reveals sinus rhythm at 65 bpm, PVC x1.  Possible old anteroseptal MI.  Nonspecific lateral T wave abnormalities.  QTc 465 ms.    Telemetry: Telemetry currently only artifact.     I have personally reviewed the EKG/Telemetry strip.  ---------------------------------------------------------------------------------------------------------------------   Results from last 7 days   Lab Units 07/10/19  1401 07/10/19  1044   TROPONIN T ng/mL <0.010 <0.010     Results from last 7 days   Lab Units 07/10/19  1044   PROBNP pg/mL 2,289.0*         Results from last 7 days   Lab Units 07/10/19  1044   WBC 10*3/mm3 9.10   HEMOGLOBIN g/dL 12.9*   HEMATOCRIT % 40.3   MCV fL 86.5   MCHC g/dL 32.0   PLATELETS 10*3/mm3 233     Results from last 7 days   Lab Units 07/10/19  1044   SODIUM mmol/L 144   POTASSIUM mmol/L 3.3*   CHLORIDE mmol/L 105   CO2 mmol/L 23.5   BUN mg/dL 14   CREATININE mg/dL 0.75*   EGFR IF NONAFRICN AM mL/min/1.73 105   CALCIUM mg/dL 9.2   GLUCOSE mg/dL 81   ALBUMIN g/dL 3.34*   BILIRUBIN mg/dL 0.9   ALK PHOS U/L 101   AST  (SGOT) U/L 16   ALT (SGPT) U/L 7   Estimated Creatinine Clearance: 70.8 mL/min (A) (by C-G formula based on SCr of 0.75 mg/dL (L)).  No results found for: AMMONIA    No results found for: HGBA1C, POCGLU  No results found for: HGBA1C  No results found for: TSH, FREET4    Pain Management Panel     Pain Management Panel Latest Ref Rng & Units 7/10/2019    AMPHETAMINES SCREEN, URINE Negative Negative    BARBITURATES SCREEN Negative Negative    BENZODIAZEPINE SCREEN, URINE Negative Negative    BUPRENORPHINEUR Negative Negative    COCAINE SCREEN, URINE Negative Negative    METHADONE SCREEN, URINE Negative Negative        I have personally reviewed the above laboratory results.   ---------------------------------------------------------------------------------------------------------------------  Imaging Results (last 7 days)     Procedure Component Value Units Date/Time    XR Abdomen 2 View With Chest 1 View [[id number]] Collected:  07/10/19 1124     Updated:  07/10/19 1127    Narrative:       EXAMINATION: XR ABDOMEN 2 VW W CHEST 1 VW-      CLINICAL INDICATION:     abd pain, SOB     TECHNIQUE:  XR ABDOMEN 2 VW W CHEST 1 VW-      COMPARISON: 05/05/2019      FINDINGS:   Stable left cardiac pacer device.  Linear atelectasis left lower lung.   Heart size, mediastinum, and pulmonary vascularity are unremarkable.   No pneumothorax.   No effusions.   No acute osseous findings.  Moderate constipation is noted. No evidence of bowel obstruction.  No pathologic calcifications identified.       Impression:       1. Left basilar atelectasis. Otherwise stable chest.  2. Moderate constipation. No bowel obstruction.        This report was finalized on 7/10/2019 11:25 AM by Dr. Alex Benavides MD.           I have personally reviewed the above radiology results.   ---------------------------------------------------------------------------------------------------------------------  Assessment & Plan      -Chest pain/Dyspnea: Vague history.  Currently asymptomatic. EKG is without acute changes compared to 05/2019. He received aspirin 324 mg x 1 in the ED.  Continue aspirin 81 mg daily.  Check fasting lipid panel in a.m.  Resume home atorvastatin. No beta blocker for now secondary to baseline hypotension. Follow-up on serial troponins.  Check transthoracic echocardiogram.  I have requested the most recent discharge summary and any cardiac studies from [facility] recent admission.  I have also requested the last office visit from his primary cardiologist, Dr. Alberts.  If admission is uneventful, troponins remain negative, and echocardiogram does not show any new significant abnormalities, will discharge home in a.m. to follow-up with his primary cardiologist. The patient does not want a stress test unless he absolutely has to.     -Hypotension: Resolved with IV fluids.  Likely secondary to dehydration.  Hold home antihypertensive medications/diuretics for now.  Place on light IV fluids with normal saline at 50 cc/h.     -Hypokalemia: Replace orally per protocol. Check magnesium and replace if needed. Repeat BMP in AM.     -Constipation: He was disimpacted in the ED. Moderate constipation on XR with no bowel obstruction. IVF as noted above. Place on Miralax 17g daily. Sennakot 2 tablets daily. PRN dulcolax suppository.     -Advanced ischemic cardiomyopathy status post AICD implantation 4-6 years: EF reportedly 22%, no recent echocardiogram per the patient and his wife. Echo pending as noted above.  Currently appears volume depleted.  Cautious with IV fluids.     -Coronary artery disease s/p stenting x5: Complete details unavailable, last intervention reported to have been at least 2 years ago. Continue aspirin/statin. Evaluation as noted above.     -Generalized weakness/fatigue: Likely due to chronic illness and recent hospital admission. Check TSH. Continue IV fluids. Nutrition consult for malnutrition severity assessment. He has home  "health with PT arranged, which was to start today.     -Dyslipidemia: Continue home statin, lipid panel in a.m.    -Type 2 diabetes mellitus: Recently poor oral intake with nausea and constipation.  Glucose 81 while in the ED.  Check hemoglobin A1c level.  We will hold off on sliding scale insulin for now given poor oral intake.  Accu-Cheks before meals and at bedtime.    -Chronic anticoagulation with Eliquis: Unclear indication. Wife reports that he may have had Afib in the past, but she is unsure. She states that he had a \"blood clot in his heart\" at one point as well. Cardiology records requested.     DVT Prophylaxis: Eliquis.     The patient is considered to be a high risk patient due to: Hypotension, Advanced cardiomyopathy, known CAD.     Code Status: FULL CODE.     I have discussed the patient's assessment and plan with the patient, his wife at bedside, and admitting physician, Dr. Early.     BRISA Blakely  07/10/19  3:16 PM  ---------------------------------------------------------------------------------------------------------------------     "

## 2019-07-11 ENCOUNTER — APPOINTMENT (OUTPATIENT)
Dept: CARDIOLOGY | Facility: HOSPITAL | Age: 64
End: 2019-07-11

## 2019-07-11 LAB
ANION GAP SERPL CALCULATED.3IONS-SCNC: 10.9 MMOL/L (ref 5–15)
BASOPHILS # BLD AUTO: 0.06 10*3/MM3 (ref 0–0.2)
BASOPHILS NFR BLD AUTO: 0.7 % (ref 0–1.5)
BH CV ECHO MEAS - AO MAX PG (FULL): 3.2 MMHG
BH CV ECHO MEAS - AO MAX PG: 13.4 MMHG
BH CV ECHO MEAS - AO MEAN PG (FULL): 1 MMHG
BH CV ECHO MEAS - AO MEAN PG: 6 MMHG
BH CV ECHO MEAS - AO V2 MAX: 183 CM/SEC
BH CV ECHO MEAS - AO V2 MEAN: 110 CM/SEC
BH CV ECHO MEAS - AO V2 VTI: 31.9 CM
BH CV ECHO MEAS - AVA(I,A): 3.3 CM^2
BH CV ECHO MEAS - AVA(I,D): 3.3 CM^2
BH CV ECHO MEAS - AVA(V,A): 2.7 CM^2
BH CV ECHO MEAS - AVA(V,D): 2.7 CM^2
BH CV ECHO MEAS - BSA(HAYCOCK): 1.6 M^2
BH CV ECHO MEAS - BSA: 1.6 M^2
BH CV ECHO MEAS - BZI_BMI: 18.6 KILOGRAMS/M^2
BH CV ECHO MEAS - BZI_METRIC_HEIGHT: 170.2 CM
BH CV ECHO MEAS - BZI_METRIC_WEIGHT: 54 KG
BH CV ECHO MEAS - EDV(MOD-SP4): 106 ML
BH CV ECHO MEAS - EF(MOD-SP4): 45.3 %
BH CV ECHO MEAS - ESV(MOD-SP4): 58 ML
BH CV ECHO MEAS - LV DIASTOLIC VOL/BSA (35-75): 65.4 ML/M^2
BH CV ECHO MEAS - LV MAX PG: 10.2 MMHG
BH CV ECHO MEAS - LV MEAN PG: 5 MMHG
BH CV ECHO MEAS - LV SYSTOLIC VOL/BSA (12-30): 35.8 ML/M^2
BH CV ECHO MEAS - LV V1 MAX: 160 CM/SEC
BH CV ECHO MEAS - LV V1 MEAN: 105 CM/SEC
BH CV ECHO MEAS - LV V1 VTI: 33.8 CM
BH CV ECHO MEAS - LVLD AP4: 9 CM
BH CV ECHO MEAS - LVLS AP4: 8.7 CM
BH CV ECHO MEAS - LVOT AREA (M): 3.1 CM^2
BH CV ECHO MEAS - LVOT AREA: 3.1 CM^2
BH CV ECHO MEAS - LVOT DIAM: 2 CM
BH CV ECHO MEAS - MV A MAX VEL: 93.2 CM/SEC
BH CV ECHO MEAS - MV E MAX VEL: 41.1 CM/SEC
BH CV ECHO MEAS - MV E/A: 0.44
BH CV ECHO MEAS - RAP SYSTOLE: 10 MMHG
BH CV ECHO MEAS - RVSP: 34.6 MMHG
BH CV ECHO MEAS - SI(LVOT): 65.5 ML/M^2
BH CV ECHO MEAS - SI(MOD-SP4): 29.6 ML/M^2
BH CV ECHO MEAS - SV(LVOT): 106.2 ML
BH CV ECHO MEAS - SV(MOD-SP4): 48 ML
BH CV ECHO MEAS - TR MAX VEL: 248 CM/SEC
BUN BLD-MCNC: 13 MG/DL (ref 8–23)
BUN/CREAT SERPL: 24.5 (ref 7–25)
CALCIUM SPEC-SCNC: 8.5 MG/DL (ref 8.6–10.5)
CHLORIDE SERPL-SCNC: 108 MMOL/L (ref 98–107)
CHOLEST SERPL-MCNC: 141 MG/DL (ref 0–200)
CO2 SERPL-SCNC: 22.1 MMOL/L (ref 22–29)
CREAT BLD-MCNC: 0.53 MG/DL (ref 0.76–1.27)
DEPRECATED RDW RBC AUTO: 47.6 FL (ref 37–54)
EOSINOPHIL # BLD AUTO: 0.14 10*3/MM3 (ref 0–0.4)
EOSINOPHIL NFR BLD AUTO: 1.6 % (ref 0.3–6.2)
ERYTHROCYTE [DISTWIDTH] IN BLOOD BY AUTOMATED COUNT: 15 % (ref 12.3–15.4)
GFR SERPL CREATININE-BSD FRML MDRD: >150 ML/MIN/1.73
GLUCOSE BLD-MCNC: 72 MG/DL (ref 65–99)
GLUCOSE BLDC GLUCOMTR-MCNC: 125 MG/DL (ref 70–130)
GLUCOSE BLDC GLUCOMTR-MCNC: 137 MG/DL (ref 70–130)
GLUCOSE BLDC GLUCOMTR-MCNC: 68 MG/DL (ref 70–130)
GLUCOSE BLDC GLUCOMTR-MCNC: 73 MG/DL (ref 70–130)
HCT VFR BLD AUTO: 36.1 % (ref 37.5–51)
HDLC SERPL-MCNC: 28 MG/DL (ref 40–60)
HGB BLD-MCNC: 11.4 G/DL (ref 13–17.7)
IMM GRANULOCYTES # BLD AUTO: 0.02 10*3/MM3 (ref 0–0.05)
IMM GRANULOCYTES NFR BLD AUTO: 0.2 % (ref 0–0.5)
LDLC SERPL CALC-MCNC: 92 MG/DL (ref 0–100)
LDLC/HDLC SERPL: 3.28 {RATIO}
LV EF 2D ECHO EST: 35 %
LYMPHOCYTES # BLD AUTO: 1.39 10*3/MM3 (ref 0.7–3.1)
LYMPHOCYTES NFR BLD AUTO: 16.1 % (ref 19.6–45.3)
MAGNESIUM SERPL-MCNC: 1.7 MG/DL (ref 1.6–2.4)
MAXIMAL PREDICTED HEART RATE: 155 BPM
MCH RBC QN AUTO: 27.7 PG (ref 26.6–33)
MCHC RBC AUTO-ENTMCNC: 31.6 G/DL (ref 31.5–35.7)
MCV RBC AUTO: 87.8 FL (ref 79–97)
MONOCYTES # BLD AUTO: 0.62 10*3/MM3 (ref 0.1–0.9)
MONOCYTES NFR BLD AUTO: 7.2 % (ref 5–12)
NEUTROPHILS # BLD AUTO: 6.4 10*3/MM3 (ref 1.7–7)
NEUTROPHILS NFR BLD AUTO: 74.2 % (ref 42.7–76)
PHOSPHATE SERPL-MCNC: 2.5 MG/DL (ref 2.5–4.5)
PLATELET # BLD AUTO: 188 10*3/MM3 (ref 140–450)
PMV BLD AUTO: 10.9 FL (ref 6–12)
POTASSIUM BLD-SCNC: 3.8 MMOL/L (ref 3.5–5.2)
RBC # BLD AUTO: 4.11 10*6/MM3 (ref 4.14–5.8)
SODIUM BLD-SCNC: 141 MMOL/L (ref 136–145)
STRESS TARGET HR: 132 BPM
TRIGL SERPL-MCNC: 106 MG/DL (ref 0–150)
VLDLC SERPL-MCNC: 21.2 MG/DL
WBC NRBC COR # BLD: 8.63 10*3/MM3 (ref 3.4–10.8)

## 2019-07-11 PROCEDURE — 80048 BASIC METABOLIC PNL TOTAL CA: CPT | Performed by: PHYSICIAN ASSISTANT

## 2019-07-11 PROCEDURE — 83735 ASSAY OF MAGNESIUM: CPT | Performed by: NURSE PRACTITIONER

## 2019-07-11 PROCEDURE — 93306 TTE W/DOPPLER COMPLETE: CPT

## 2019-07-11 PROCEDURE — G0378 HOSPITAL OBSERVATION PER HR: HCPCS

## 2019-07-11 PROCEDURE — 99226 PR SBSQ OBSERVATION CARE/DAY 35 MINUTES: CPT | Performed by: NURSE PRACTITIONER

## 2019-07-11 PROCEDURE — 51798 US URINE CAPACITY MEASURE: CPT

## 2019-07-11 PROCEDURE — 96365 THER/PROPH/DIAG IV INF INIT: CPT

## 2019-07-11 PROCEDURE — 85025 COMPLETE CBC W/AUTO DIFF WBC: CPT | Performed by: PHYSICIAN ASSISTANT

## 2019-07-11 PROCEDURE — 82962 GLUCOSE BLOOD TEST: CPT

## 2019-07-11 PROCEDURE — 84100 ASSAY OF PHOSPHORUS: CPT | Performed by: NURSE PRACTITIONER

## 2019-07-11 PROCEDURE — 94799 UNLISTED PULMONARY SVC/PX: CPT

## 2019-07-11 PROCEDURE — 80061 LIPID PANEL: CPT | Performed by: PHYSICIAN ASSISTANT

## 2019-07-11 PROCEDURE — 96366 THER/PROPH/DIAG IV INF ADDON: CPT

## 2019-07-11 PROCEDURE — 93306 TTE W/DOPPLER COMPLETE: CPT | Performed by: INTERNAL MEDICINE

## 2019-07-11 RX ORDER — MAGNESIUM SULFATE HEPTAHYDRATE 40 MG/ML
2 INJECTION, SOLUTION INTRAVENOUS AS NEEDED
Status: DISCONTINUED | OUTPATIENT
Start: 2019-07-11 | End: 2019-07-12 | Stop reason: HOSPADM

## 2019-07-11 RX ORDER — MAGNESIUM SULFATE HEPTAHYDRATE 40 MG/ML
4 INJECTION, SOLUTION INTRAVENOUS ONCE
Status: COMPLETED | OUTPATIENT
Start: 2019-07-11 | End: 2019-07-11

## 2019-07-11 RX ORDER — LACTULOSE 10 G/15ML
10 SOLUTION ORAL ONCE
Status: COMPLETED | OUTPATIENT
Start: 2019-07-11 | End: 2019-07-11

## 2019-07-11 RX ORDER — MAGNESIUM SULFATE HEPTAHYDRATE 40 MG/ML
4 INJECTION, SOLUTION INTRAVENOUS AS NEEDED
Status: DISCONTINUED | OUTPATIENT
Start: 2019-07-11 | End: 2019-07-12 | Stop reason: HOSPADM

## 2019-07-11 RX ORDER — POTASSIUM CHLORIDE 1.5 G/1.77G
40 POWDER, FOR SOLUTION ORAL AS NEEDED
Status: DISCONTINUED | OUTPATIENT
Start: 2019-07-11 | End: 2019-07-12 | Stop reason: HOSPADM

## 2019-07-11 RX ORDER — POTASSIUM CHLORIDE 7.45 MG/ML
10 INJECTION INTRAVENOUS
Status: DISCONTINUED | OUTPATIENT
Start: 2019-07-11 | End: 2019-07-12 | Stop reason: HOSPADM

## 2019-07-11 RX ORDER — POTASSIUM CHLORIDE 20 MEQ/1
40 TABLET, EXTENDED RELEASE ORAL AS NEEDED
Status: DISCONTINUED | OUTPATIENT
Start: 2019-07-11 | End: 2019-07-12 | Stop reason: HOSPADM

## 2019-07-11 RX ADMIN — MAGNESIUM SULFATE HEPTAHYDRATE 4 G: 40 INJECTION, SOLUTION INTRAVENOUS at 17:51

## 2019-07-11 RX ADMIN — SENNOSIDES AND DOCUSATE SODIUM 2 TABLET: 8.6; 5 TABLET ORAL at 20:22

## 2019-07-11 RX ADMIN — DEXTROSE 15 G: 15 GEL ORAL at 06:54

## 2019-07-11 RX ADMIN — APIXABAN 5 MG: 5 TABLET, FILM COATED ORAL at 09:47

## 2019-07-11 RX ADMIN — SODIUM CHLORIDE, PRESERVATIVE FREE 3 ML: 5 INJECTION INTRAVENOUS at 09:50

## 2019-07-11 RX ADMIN — POLYETHYLENE GLYCOL (3350) 17 G: 17 POWDER, FOR SOLUTION ORAL at 09:47

## 2019-07-11 RX ADMIN — LACTULOSE 10 G: 10 SOLUTION ORAL at 17:51

## 2019-07-11 RX ADMIN — ATORVASTATIN CALCIUM 20 MG: 20 TABLET, FILM COATED ORAL at 20:22

## 2019-07-11 RX ADMIN — APIXABAN 5 MG: 5 TABLET, FILM COATED ORAL at 20:22

## 2019-07-11 RX ADMIN — SODIUM CHLORIDE 50 ML/HR: 9 INJECTION, SOLUTION INTRAVENOUS at 14:01

## 2019-07-11 NOTE — PLAN OF CARE
Problem: Patient Care Overview  Goal: Plan of Care Review  Outcome: Ongoing (interventions implemented as appropriate)   07/10/19 1936 07/11/19 0020   Plan of Care Review   Progress --  improving   Coping/Psychosocial   Plan of Care Reviewed With patient --        Problem: Skin Injury Risk (Adult)  Goal: Identify Related Risk Factors and Signs and Symptoms  Outcome: Ongoing (interventions implemented as appropriate)   07/10/19 1719   Skin Injury Risk (Adult)   Related Risk Factors (Skin Injury Risk) advanced age;body weight extremes;moisture;mobility impaired       Problem: Nausea/Vomiting (Adult)  Goal: Identify Related Risk Factors and Signs and Symptoms  Outcome: Ongoing (interventions implemented as appropriate)   07/10/19 1719   Nausea/Vomiting (Adult)   Related Risk Factors (Nausea/Vomiting) gastrointestinal dysfunction;gastric irritation   Signs and Symptoms (Nausea/Vomiting) dry mucous membranes;food aversion

## 2019-07-11 NOTE — PLAN OF CARE
Problem: Patient Care Overview  Goal: Plan of Care Review  Outcome: Ongoing (interventions implemented as appropriate)    Goal: Individualization and Mutuality  Outcome: Ongoing (interventions implemented as appropriate)    Goal: Discharge Needs Assessment  Outcome: Ongoing (interventions implemented as appropriate)    Goal: Interprofessional Rounds/Family Conf  Outcome: Ongoing (interventions implemented as appropriate)      Problem: Skin Injury Risk (Adult)  Goal: Identify Related Risk Factors and Signs and Symptoms  Outcome: Ongoing (interventions implemented as appropriate)    Goal: Skin Health and Integrity  Outcome: Ongoing (interventions implemented as appropriate)      Problem: Nausea/Vomiting (Adult)  Goal: Symptom Relief  Outcome: Ongoing (interventions implemented as appropriate)    Goal: Adequate Hydration  Outcome: Ongoing (interventions implemented as appropriate)      Problem: Fall Risk (Adult)  Goal: Identify Related Risk Factors and Signs and Symptoms  Outcome: Ongoing (interventions implemented as appropriate)    Goal: Absence of Fall  Outcome: Ongoing (interventions implemented as appropriate)

## 2019-07-11 NOTE — PROGRESS NOTES
Discharge Planning Assessment   [place]     Patient Name: Sachin Rubio  MRN: [id number]  Today's Date: 7/11/2019    Admit Date: 7/10/2019    Discharge Needs Assessment     Row Name 07/11/19 1052       Living Environment    Lives With  spouse    Name(s) of Who Lives With Patient  Pt lives with his wife, Angeli.    Current Living Arrangements  home/apartment/condo    Primary Care Provided by  self;spouse/significant other;homecare agency Pt has [facility].    Provides Primary Care For  no one, unable/limited ability to care for self    Family Caregiver if Needed  spouse    Quality of Family Relationships  helpful;involved    Able to Return to Prior Arrangements  yes       Resource/Environmental Concerns    Transportation Concerns  car, none       Transition Planning    Patient/Family Anticipates Transition to  home with family;home with help/services    Patient/Family Anticipated Services at Transition  durable medical equipment Rollator    Transportation Anticipated  family or friend will provide       Discharge Needs Assessment    Equipment Currently Used at Home  cane, straight    Equipment Needed After Discharge  rollator        Discharge Plan     Row Name 07/11/19 2014       Plan    Plan  SS spoke with pt on this day. Pt lives at home with his wife Angeli. Pt receives [facility] services. Pt has a cane, and has requested a Rollator, with no preference for the agency. Pt uses [facility], and his PCP is John Rios. Pt plans to return home at discharge, with transportation provided by a family member. SS will follow and assist as needed.     Patient/Family in Agreement with Plan  yes            Demographic Summary     Row Name 07/11/19 1051       General Information    Admission Type  observation    Referral Source  nursing    Reason for Consult  discharge planning    Preferred Language  English     Used During This Interaction  no          YAO Jimenez

## 2019-07-11 NOTE — PROGRESS NOTES
Patient Identification:  Name:  Sachin Rubio  Age:  63 y.o.  Sex:  male  :  1955  MRN:  [id number]  Visit Number:  [id number]  Primary Care Provider:  John Rios MD    Length of stay:  0    Chief Complaint: constipation     HPI:    Mr. Rubio is a 63 year old male patient who was admitted to the observation unit on 7/10/19 for constipation from the ED. His past medical history is significant for CAD s/p stent x5, Advanced Ischemic Cardiomyopathy s/p AICD around 4-6 years ago, DM Type 2, and dyslipidemia.     Subjective:      Mr. Rubio is sitting up in the bedside chair on my exam today, he denies any abdominal pain, no nausea or vomiting. He denies any dyspnea or chest pain. His wife is at bedside during exam. Discussed with BRANDY evangelista.   ----------------------------------------------------------------------------------------------------------------------  Current Hospital Meds:    apixaban 5 mg Oral Q12H   atorvastatin 20 mg Oral Nightly   lactulose 10 g Oral Once   magnesium sulfate 4 g Intravenous Once   polyethylene glycol 17 g Oral Daily   sennosides-docusate sodium 2 tablet Oral Nightly   sodium chloride 3 mL Intravenous Q12H       sodium chloride 50 mL/hr Last Rate: 50 mL/hr (19 1604)     ----------------------------------------------------------------------------------------------------------------------  Vital Signs:  Temp:  [98 °F (36.7 °C)-98.5 °F (36.9 °C)] 98.1 °F (36.7 °C)  Heart Rate:  [65-73] 72  Resp:  [18] 18  BP: ()/(58-65) 94/62      07/10/19  1004 07/10/19  1621   Weight: 54.4 kg (120 lb) 54.3 kg (119 lb 9.6 oz)     Body mass index is 18.73 kg/m².    Intake/Output Summary (Last 24 hours) at 2019 1627  Last data filed at 2019 0900  Gross per 24 hour   Intake 0 ml   Output --   Net 0 ml     No intake/output data recorded.  Diet Regular;  Cardiac  ----------------------------------------------------------------------------------------------------------------------  Physical exam:  Constitutional:  Pale, thin chronically ill appearing male.   HENT:  Head:  Normocephalic and atraumatic.  Mouth:  Moist mucous membranes.    Eyes:Pupils are equal, round, and reactive to light.  No scleral icterus.    Neck:  Neck supple.  No JVD present.    Cardiovascular:  Normal rate, regular rhythm and normal heart sounds with no murmur.  Pulmonary/Chest:  No respiratory distress, no wheezes, no crackles, with normal breath sounds and good air movement.  Abdominal:  Soft.  Bowel sounds are normal.  No distension and no tenderness.   Musculoskeletal:  No edema, no tenderness, and no deformity.  No red or swollen joints anywhere.    Neurological:  Alert and oriented to person, place, and time.   No tongue deviation.  No facial droop.  No slurred speech.   Skin:  Skin is warm and dry. No rash noted. No pallor.   ----------------------------------------------------------------------------------------------------------------------  Tele:        ----------------------------------------------------------------------------------------------------------------------  Results from last 7 days   Lab Units 07/10/19  1401 07/10/19  1044   TROPONIN T ng/mL <0.010 <0.010   PROBNP pg/mL  --  2,289.0*     Results from last 7 days   Lab Units 07/11/19  0131 07/10/19  1044   WBC 10*3/mm3 8.63 9.10   HEMOGLOBIN g/dL 11.4* 12.9*   HEMATOCRIT % 36.1* 40.3   MCV fL 87.8 86.5   MCHC g/dL 31.6 32.0   PLATELETS 10*3/mm3 188 233         Results from last 7 days   Lab Units 07/11/19  0131 07/10/19  1401 07/10/19  1044   SODIUM mmol/L 141  --  144   POTASSIUM mmol/L 3.8  --  3.3*   MAGNESIUM mg/dL 1.7 1.8  --    CHLORIDE mmol/L 108*  --  105   CO2 mmol/L 22.1  --  23.5   BUN mg/dL 13  --  14   CREATININE mg/dL 0.53*  --  0.75*   EGFR IF NONAFRICN AM mL/min/1.73 >150  --  105   CALCIUM mg/dL 8.5*  --   9.2   GLUCOSE mg/dL 72  --  81   ALBUMIN g/dL  --   --  3.34*   BILIRUBIN mg/dL  --   --  0.9   ALK PHOS U/L  --   --  101   AST (SGOT) U/L  --   --  16   ALT (SGPT) U/L  --   --  7   Estimated Creatinine Clearance: 70.7 mL/min (A) (by C-G formula based on SCr of 0.53 mg/dL (L)).  No results found for: AMMONIA  Results from last 7 days   Lab Units 07/11/19  0131   CHOLESTEROL mg/dL 141   TRIGLYCERIDES mg/dL 106   HDL CHOL mg/dL 28*   LDL CHOL mg/dL 92                 ----------------------------------------------------------------------------------------------------------------------  Imaging Results (last 24 hours)     ** No results found for the last 24 hours. **        ----------------------------------------------------------------------------------------------------------------------  Assessment and Plan:    Chest pain/Dyspnea: denies any cp or dyspnea on exam today, in no distress. Troponin trend was flat. Echo pending. Continue to monitor on telemetry. Awaiting DC summary from [facility]    Hypotension: BP remains borderline at 94/62, continue IV fluids and monitor fluid status closely. No BP meds being given.     Hypokalemia: resolved, potassium is 3.8.     Hypomagnesemia: mag level is 1.7, replacement ordered per protocol, repeat in the am.     Constipation: no bowel movement since disimpaction in the ED. Continue miralax and senna. Lactulose x1 PO ordered. Denies any abdominal pain, abdomen remains soft and non-tender with bowel sounds. Patient reports his last BM was 2-3 days prior to admission. He does have a colonoscopy scheduled with Dr. Herman on 7/31/19 for his history of polyps.     CAD s/p stent, Advanced ischemic Cardiomyopathy s/p AICD: repeat echo done here and read is pending, trop trend negative, follows [facility]ology at [facility]. Monitor on telemetry, denies any current chest pain or dyspnea. Reported echo is apparently around 22%, awaiting echo reading, does not appear in fluid overload at this time.  BP currently to low for ARB/ACE and beta blocker.     DM Type 2, non-insulin dependent: hypoglycemia protocol initiated, accu checks ac/hs and prn, diabetic diet, no sliding scale ordered at this time.      Chronic Anticoagulation: on eliquis at home, awaiting records [facility] [place].    Urinary retention: bladder scan at 1pm showed 210ml, repeat is pending, if >300ml of urine on scan will straight cath and repeat bladder scan in 4 hours.     Functional decline/Weakness: PT/OT ordered. Wife reports functional decline over the last 2 months. They are set up with PT home health per his PCP.     DVT prophylaxis: on eliquis PO        Bess Burns, APRN  07/11/19  4:27 PM

## 2019-07-11 NOTE — PHARMACY PATIENT ASSISTANCE
Pharmacy evaluated patient co-pay for Eliquis. The patients co-pay is $41, however, he reported this was not affordable and is currently receiving samples from the doctor. At this time, it does not appear that the patient would qualify for an assistance program and best option is to continue to receiving samples if the physician can continue to provide. The patient is aware that he currently doesn't qualify for assistance programs.       Thank you,  Eve Rodrigues. SANDEEP Ng  07/11/19  2:23 PM

## 2019-07-11 NOTE — PLAN OF CARE
Problem: Patient Care Overview  Goal: Plan of Care Review  Outcome: Ongoing (interventions implemented as appropriate)      Problem: Skin Injury Risk (Adult)  Goal: Identify Related Risk Factors and Signs and Symptoms  Outcome: Ongoing (interventions implemented as appropriate)    Goal: Skin Health and Integrity  Outcome: Ongoing (interventions implemented as appropriate)      Problem: Nausea/Vomiting (Adult)  Goal: Identify Related Risk Factors and Signs and Symptoms  Outcome: Ongoing (interventions implemented as appropriate)    Goal: Symptom Relief  Outcome: Ongoing (interventions implemented as appropriate)    Goal: Adequate Hydration  Outcome: Ongoing (interventions implemented as appropriate)

## 2019-07-12 VITALS
RESPIRATION RATE: 18 BRPM | WEIGHT: 119.6 LBS | OXYGEN SATURATION: 95 % | DIASTOLIC BLOOD PRESSURE: 57 MMHG | SYSTOLIC BLOOD PRESSURE: 105 MMHG | HEART RATE: 77 BPM | HEIGHT: 67 IN | BODY MASS INDEX: 18.77 KG/M2 | TEMPERATURE: 98.6 F

## 2019-07-12 LAB
ALBUMIN SERPL-MCNC: 3.32 G/DL (ref 3.5–5.2)
ALBUMIN/GLOB SERPL: 1.1 G/DL
ALP SERPL-CCNC: 101 U/L (ref 39–117)
ALT SERPL W P-5'-P-CCNC: 7 U/L (ref 1–41)
ANION GAP SERPL CALCULATED.3IONS-SCNC: 14.1 MMOL/L (ref 5–15)
AST SERPL-CCNC: 16 U/L (ref 1–40)
BASOPHILS # BLD AUTO: 0.05 10*3/MM3 (ref 0–0.2)
BASOPHILS NFR BLD AUTO: 0.5 % (ref 0–1.5)
BILIRUB SERPL-MCNC: 0.7 MG/DL (ref 0.2–1.2)
BUN BLD-MCNC: 6 MG/DL (ref 8–23)
BUN/CREAT SERPL: 8.5 (ref 7–25)
CALCIUM SPEC-SCNC: 8.9 MG/DL (ref 8.6–10.5)
CHLORIDE SERPL-SCNC: 102 MMOL/L (ref 98–107)
CO2 SERPL-SCNC: 21.9 MMOL/L (ref 22–29)
CREAT BLD-MCNC: 0.71 MG/DL (ref 0.76–1.27)
DEPRECATED RDW RBC AUTO: 49.3 FL (ref 37–54)
EOSINOPHIL # BLD AUTO: 0.13 10*3/MM3 (ref 0–0.4)
EOSINOPHIL NFR BLD AUTO: 1.4 % (ref 0.3–6.2)
ERYTHROCYTE [DISTWIDTH] IN BLOOD BY AUTOMATED COUNT: 15.4 % (ref 12.3–15.4)
GFR SERPL CREATININE-BSD FRML MDRD: 111 ML/MIN/1.73
GLOBULIN UR ELPH-MCNC: 3 GM/DL
GLUCOSE BLD-MCNC: 144 MG/DL (ref 65–99)
GLUCOSE BLDC GLUCOMTR-MCNC: 101 MG/DL (ref 70–130)
GLUCOSE BLDC GLUCOMTR-MCNC: 114 MG/DL (ref 70–130)
GLUCOSE BLDC GLUCOMTR-MCNC: 137 MG/DL (ref 70–130)
HCT VFR BLD AUTO: 42.7 % (ref 37.5–51)
HGB BLD-MCNC: 13.6 G/DL (ref 13–17.7)
IMM GRANULOCYTES # BLD AUTO: 0.02 10*3/MM3 (ref 0–0.05)
IMM GRANULOCYTES NFR BLD AUTO: 0.2 % (ref 0–0.5)
LYMPHOCYTES # BLD AUTO: 1.21 10*3/MM3 (ref 0.7–3.1)
LYMPHOCYTES NFR BLD AUTO: 12.9 % (ref 19.6–45.3)
MAGNESIUM SERPL-MCNC: 2.4 MG/DL (ref 1.6–2.4)
MCH RBC QN AUTO: 27.9 PG (ref 26.6–33)
MCHC RBC AUTO-ENTMCNC: 31.9 G/DL (ref 31.5–35.7)
MCV RBC AUTO: 87.5 FL (ref 79–97)
MONOCYTES # BLD AUTO: 0.45 10*3/MM3 (ref 0.1–0.9)
MONOCYTES NFR BLD AUTO: 4.8 % (ref 5–12)
NEUTROPHILS # BLD AUTO: 7.52 10*3/MM3 (ref 1.7–7)
NEUTROPHILS NFR BLD AUTO: 80.2 % (ref 42.7–76)
PLATELET # BLD AUTO: 248 10*3/MM3 (ref 140–450)
PMV BLD AUTO: 11.1 FL (ref 6–12)
POTASSIUM BLD-SCNC: 3.6 MMOL/L (ref 3.5–5.2)
PROT SERPL-MCNC: 6.3 G/DL (ref 6–8.5)
RBC # BLD AUTO: 4.88 10*6/MM3 (ref 4.14–5.8)
SODIUM BLD-SCNC: 138 MMOL/L (ref 136–145)
WBC NRBC COR # BLD: 9.38 10*3/MM3 (ref 3.4–10.8)

## 2019-07-12 PROCEDURE — 80053 COMPREHEN METABOLIC PANEL: CPT | Performed by: NURSE PRACTITIONER

## 2019-07-12 PROCEDURE — G0378 HOSPITAL OBSERVATION PER HR: HCPCS

## 2019-07-12 PROCEDURE — 83735 ASSAY OF MAGNESIUM: CPT | Performed by: NURSE PRACTITIONER

## 2019-07-12 PROCEDURE — 85025 COMPLETE CBC W/AUTO DIFF WBC: CPT | Performed by: NURSE PRACTITIONER

## 2019-07-12 PROCEDURE — 51798 US URINE CAPACITY MEASURE: CPT

## 2019-07-12 PROCEDURE — 82962 GLUCOSE BLOOD TEST: CPT

## 2019-07-12 PROCEDURE — 94799 UNLISTED PULMONARY SVC/PX: CPT

## 2019-07-12 PROCEDURE — 99217 PR OBSERVATION CARE DISCHARGE MANAGEMENT: CPT | Performed by: NURSE PRACTITIONER

## 2019-07-12 RX ORDER — ATORVASTATIN CALCIUM 20 MG/1
20 TABLET, FILM COATED ORAL NIGHTLY
Qty: 30 TABLET | Refills: 0 | Status: SHIPPED | OUTPATIENT
Start: 2019-07-12

## 2019-07-12 RX ORDER — POTASSIUM CHLORIDE 20 MEQ/1
40 TABLET, EXTENDED RELEASE ORAL EVERY 4 HOURS
Status: DISCONTINUED | OUTPATIENT
Start: 2019-07-12 | End: 2019-07-12 | Stop reason: HOSPADM

## 2019-07-12 RX ORDER — SENNA AND DOCUSATE SODIUM 50; 8.6 MG/1; MG/1
2 TABLET, FILM COATED ORAL NIGHTLY
Qty: 60 TABLET | Refills: 0 | Status: SHIPPED | OUTPATIENT
Start: 2019-07-12

## 2019-07-12 RX ADMIN — APIXABAN 5 MG: 5 TABLET, FILM COATED ORAL at 09:14

## 2019-07-12 NOTE — DISCHARGE INSTR - APPOINTMENTS
Follow-up with Gabriel on 7/19/19 @10:00AM  Follow-up with Dr. Alberts on 7/24/19 @10:30am   Health Maintenance Summary     Topic Due On Due Status Completed On    MAMMOGRAM - BREAST CANCER SCREENING Jun 6, 2018 Not Due Jun 6, 2016    Colorectal Cancer Screening - Colonoscopy Jul 1, 2018 Not Due Jul 1, 2008    Pap Smear - Cervical Cancer Screening  Apr 26, 2021 Not Due Apr 26, 2016    Immunization-Zoster Jan 12, 2017 Overdue     Immunization - TDAP Pregnancy  Hidden     IMMUNIZATION - DTaP/Tdap/Td Jan 12, 1976 Overdue     Faisal Harris Sep 1, 2017 Not Due           Patient is due for topics as listed above, she wishes to proceed with Tdap Immunization(s), but declines Zoster Immunization(s) at this time .

## 2019-07-12 NOTE — DISCHARGE SUMMARY
[facility] DISCHARGE SUMMARY    Patient Identification:  Name:  Sachin Rubio  Age:  63 y.o.  Sex:  male  :  1955  MRN:  [id number]  Visit Number:  [id number]    Date of Admission: 7/10/2019  Date of Discharge:  2019     PCP: John Rios MD    DISCHARGE DIAGNOSIS    Chest pain/dyspnea, resolved   HTN with hypotension, resolved   Hypokalemia, resolved   Hypomagnesemia, resolved   Constipation, resolved   CAD s/p stenting   Advanced Ischemic Cardiomyopathy s/p AICD 4-6 years ago, EF 35%  Mild aortic valve stenosis  DM Type 2, non-insulin dependent   Left ventricular apical thrombus   Functional Decline     CONSULTS   none    PROCEDURES PERFORMED        HPI:     Mr. Rubio is a 63 year old male patient who was admitted to the observation unit on 7/10/19 for constipation from the ED. His past medical history is significant for CAD s/p stent x5, Advanced Ischemic Cardiomyopathy s/p AICD around 4-6 years ago, EF of 35%,  Moderate aortic stenosis, left ventricular apical thrombus, DM Type 2 non-insulin dependent, and dyslipidemia.       HOSPITAL COURSE    Mr. Rubio was admitted to the observation unit, troponin T was trended and remained <0.010.  Chest x-ray and KUB was done on admission which showed left basilar atelectasis, moderate constipation with no bowel obstruction.  He was admitted and started on senna at bedtime and MiraLAX daily, the day after admission he had not yet to have a bowel movement, he was given 1 dose of lactulose and has had bowel movements prior to discharge.  Echocardiogram was also done due to his complaint of chest pain, and showed EF of 35%, full report as noted above.  His discharge summary from [facility] from his stay 2019 through 2019 was reviewed, his most recent EF there was noted to be around 40 to 45%, ours is slightly lower however this could be discrepancy per read, he does have an AICD. He did have some urinary  retention yesterday and required a one time straight cath but since having bowel movements he has been able to void on his own. He did walk the halls today with PT and did well with use of walker. His appetite has improved and able to tolerate PO intake/diet.    Discharge:     Mr. Rubio will be discharged home today with his wife.  Bowel regimen discussed with him and his wife.  I have encouraged him to keep his appointment with Dr. Herman on 7/31/2019 for his colonoscopy.  We will also have the staff set him appointment up with his cardiologist  within the next week. I have asked him and his wife to closely monitor his blood pressure 2-3 times a day. He will not be sent home on any ACE/ARB or aldactone due to his borderline hypotension, which will reuqire further follow up and monitoring by his PCP/Cardiologist. Reasons that would warrant a return to the ED discussed. He and his wife both feel he is ready for discharge today. He does have PT home health and nursing care visits set up. He has also had two episodes of slightly lower glucoses, his wife reports he does have a glucometer and supplies at home to check his sugar, I have asked him to do this 2-3 times a day and hypoglycemia treatment discussed.  He will not be sent home on any antidiabetic medications.      VITAL SIGNS:  Temp:  [97.2 °F (36.2 °C)-98.6 °F (37 °C)] 98.6 °F (37 °C)  Heart Rate:  [65-81] 77  Resp:  [18] 18  BP: ()/(57-74) 105/57  SpO2:  [94 %-99 %] 95 %  on   ;   Device (Oxygen Therapy): room air    Body mass index is 18.73 kg/m².  Wt Readings from Last 3 Encounters:   07/10/19 54.3 kg (119 lb 9.6 oz)   05/05/19 63.5 kg (140 lb)   08/29/14 78.5 kg (172 lb 15.9 oz)       PHYSICAL EXAM:    Constitutional:  Pale, thin chronically ill appearing male.   HENT:  Head:  Normocephalic and atraumatic.  Mouth:  Moist mucous membranes.    Eyes:Pupils are equal, round, and reactive to light.  No scleral icterus.    Neck:  Neck supple.  No JVD  present.    Cardiovascular:  Normal rate, regular rhythm and normal heart sounds with no murmur.  Pulmonary/Chest:  No respiratory distress, no wheezes, no crackles, with normal breath sounds and good air movement.  Abdominal:  Soft.  Bowel sounds are normal.  No distension and no tenderness.   Musculoskeletal:  No edema, no tenderness, and no deformity.  No red or swollen joints anywhere.    Neurological:  Alert and oriented to person, place, and time.   No tongue deviation.  No facial droop.  No slurred speech.   Skin:  Skin is warm and dry. No rash noted. No pallor.     DISCHARGE DISPOSITION   Stable    DISCHARGE MEDICATIONS:     Discharge Medications      New Medications      Instructions Start Date   atorvastatin 20 MG tablet  Commonly known as:  LIPITOR   20 mg, Oral, Nightly      polyethylene glycol pack packet  Commonly known as:  MIRALAX   17 g, Oral, Daily   Start Date:  7/13/2019     sennosides-docusate sodium 8.6-50 MG tablet  Commonly known as:  SENOKOT-S   2 tablets, Oral, Nightly         Continue These Medications      Instructions Start Date   apixaban 5 MG tablet tablet  Commonly known as:  ELIQUIS   5 mg, Oral, 2 Times Daily         Stop These Medications    carvedilol 3.125 MG tablet  Commonly known as:  COREG     lisinopril 5 MG tablet  Commonly known as:  PRINIVIL,ZESTRIL            Diet Instructions     Diet: Regular, Cardiac      Discharge Diet:   Regular  Cardiac           Additional Instructions for the Follow-ups that You Need to Schedule     Discharge Follow-up with PCP   As directed       Currently Documented PCP:    John Rios MD    PCP Phone Number:    [phone]     Follow Up Details:  2-3 days, repeat CBC, BMP         Discharge Follow-up with Specified Provider: Dr. Alberts ([facility]) 2-3 days   As directed      To:  Dr. Alberts ([facility]) 2-3 days           Follow-up Information     John Rios MD .    Specialty:  Family Medicine  Why:  2-3 days, repeat  CBC, BMP  Contact information:  [address] [place] [id number]  [phone]                    TEST  RESULTS PENDING AT DISCHARGE  none     CODE STATUS  Code Status and Medical Interventions:   Ordered at: 07/10/19 1515     Code Status:    CPR     Medical Interventions (Level of Support Prior to Arrest):    Full       OH Eckert  [facility] Hospitalist  07/12/19  12:49 PM    Please note that this discharge summary required more than 30 minutes to complete.

## 2019-07-12 NOTE — NURSING NOTE
MASD to coccyx and intergluteal cleft presenting as blanchable erythema.  No evidence of pressure relation.  Will treat with Z-Guard.  
Skin assessment done with off going RN Val De Los Santos.  No new issues to note, see assessment charting  
Spoke with Kacie  states Shantell will be delivering a walker to pt here at hospital.  
Walking round and skin assessment completed with BRANDY Merino. See flow sheet for skin assessment details.   
Walking round done with Corie Welch RN.  Red, blanchable  area to coccyx.  Wound care consult noted.   
Walking rounds done with Kaia De Los Santos RN.  Red blanchable area noted on coccyx.  Orders noted.   
DKA (diabetic ketoacidoses)

## 2019-07-12 NOTE — DISCHARGE PLACEMENT REQUEST
"Sachin Bernstein (63 y.o. Male)     Date of Birth Social Security Number Address Home Phone MRN    1955  [address] [address] [phone] [id number]    Denominational Marital Status          None        Admission Date Admission Type Admitting Provider Attending Provider Department, Room/Bed    7/10/19 Emergency Edwin Early MD [facility] Andrew[facility]    Discharge Date Discharge Disposition Discharge Destination         Home or Self Care              Attending Provider:  Edwin Early MD    Allergies:  No Known Allergies    Isolation:  None   Infection:  None   Code Status:  CPR    Ht:  170.2 cm (67\")   Wt:  54.3 kg (119 lb 9.6 oz)    Admission Cmt:  None   Principal Problem:  None                Active Insurance as of 7/10/2019     Primary Coverage     Payor Plan Insurance Group Employer/Plan Group    MEDICARE MEDICARE A & B      Payor Plan Address Payor Plan Phone Number Payor Plan Fax Number Effective Dates    [address] [phone]  11/1/2018 - None Entered    [facility] [id number]       Subscriber Name Subscriber Birth Date Member ID       SACHIN BERNSTEIN 1955 [id number]           Secondary Coverage     Payor Plan Insurance Group Employer/Plan Group    EVEREST MEDICARE SUPPLEMENT EVEREST MEDICAL SUPPLEMENT      Payor Plan Address Payor Plan Phone Number Payor Plan Fax Number Effective Dates    [address] [address]   5/5/2019 - None Entered    [facility] [id number]       Subscriber Name Subscriber Birth Date Member ID       SACHIN BERNSTEIN 1955 [id number]                 Emergency Contacts      (Rel.) Home Phone Work Phone Mobile Phone    YEYO BERNSTEIN (Spouse) [phone] -- --          Sachin Bernstein MRN: [id number]      7/10/2019 - 7/12/2019     [facility] OBSERVATION UNIT    After Visit Summary   Instructions     ·   Your Care at Home     Walker   ·   Your medications have changed     START taking: "   ? atorvastatin (LIPITOR)   ? polyethylene glycol (MIRALAX)   Start taking on: 7/13/2019   ? sennosides-docusate sodium (SENOKOT-S)   STOP taking:   ? carvedilol 3.125 MG tablet (COREG)   ? lisinopril 5 MG tablet (PRINIVIL,ZESTRIL)   Review your updated medication list below.   Your Next Steps     Ask     ·   Ask how to get these   ? polyethylene glycol   Do     ·    these medications from [address] - [phone] [facility] [phone] FX   ? atorvastatin   ? sennosides-docusate sodium   If you have any questions about your recovery, please call the [facility] Nurse Call Center at [phone]. A registered nurse is available 24 hours a day 7 days a week to assist you.   ·   Diet instructions     Diet: Regular, Cardiac   Discharge Diet:   Regular  Cardiac   ·   Other instructions     Discharge Follow-up with PCP   Currently Documented PCP:   John Rios MD   PCP Phone Number:   [phone]   Discharge Follow-up with Specified Provider: Dr. Alberts ([facility]) 2-3 days   Walker   What's next     What's next          Follow up with John Rios MD   2-3 days, repeat CBC, BMP  [address] [facility] [address]   [phone]    ·   Additional Information     Follow-up with Gabriel on 7/19/19 @10:00AM  Follow-up with Dr. Alberts on 7/24/19 @10:30am   Your Allergies   Date Reviewed: 7/10/2019   Your Allergies   No active allergies   Patient Belongings Returned     Document Return of Belongings Flowsheet     Were the patient bedside belongings sent home? --   Medications Retrieved from Pharmacy & Sent Home --   Belongings Sent to Safe --   Belongings sent with: --   Belongings Retrieved from Security & Sent Home --       Wot[employer] Signup     [facility] [employer] allows you to send messages to your doctor, view your test results, renew your prescriptions, schedule appointments, and more. To sign up, go to PopSeal and click on the Sign Up  Now link in the New User? box. Enter your [employer] Activation Code exactly as it appears below along with the last four digits of your Social Security Number and your Date of Birth () to complete the sign-up process. If you do not sign up before the expiration date, you must request a new code.     [employer] Activation Code: [email or web address]  Expires: 8/10/2019  8:40 AM     If you have questions, you can email [email or web address] or call [phone] to talk to our [employer] staff. Remember, [employer] is NOT to be used for urgent needs. For medical emergencies, dial 911.     Medication List   Medication List     Morning Afternoon Evening Bedtime As Needed    apixaban 5 MG tablet tablet   Commonly known as: ELIQUIS   Take 5 mg by mouth 2 (Two) Times a Day.   Last time this was given: 5 mg on 2019  9:14 AM          atorvastatin 20 MG tablet   Commonly known as: LIPITOR   Take 1 tablet by mouth Every Night.   Last time this was given: 20 mg on 2019  8:22 PM          polyethylene glycol pack packet   Commonly known as: MIRALAX   Start taking on: 2019   Take 17 g by mouth Daily.   Last time this was given: 17 g on 2019  9:47 AM          sennosides-docusate sodium 8.6-50 MG tablet   Commonly known as: SENOKOT-S   Take 2 tablets by mouth Every Night.   Last time this was given: 2 tablets on 2019  8:22 PM         Where to  your medications     ·    these medications at [facility], [place] - [address] - [phone] [facility] [phone] FX     ? atorvastatin • sennosides-docusate sodium   Address[facility] [address] [facility] [address]   Phone: [phone]   ·   Ask your doctor where to pick these up     ? • polyethylene glycol pack packet   Always carry an updated list of your medications with you. If there is an emergency, a responder can quickly see what medications you are taking. Take this paperwork with you the next time you see your health care  "provider.        [employer] Sign-Up     Send messages to your doctor, view your test results, renew your prescriptions, schedule appointments, and more.   Go to [email or web address], click \"Sign Up Now\", and enter your personal activation code: [email or web address]. Activation code expires 8/10/2019.   Pneumonia Vaccination     Please follow up with your primary care provider or retail pharmacy to see if you are eligible for a pneumonia vaccination.        Opioid Resource     If you or someone you know needs information on substance abuse, please visit   [email or web address] for listings of facilities and resources across [place].  Stroke Symptoms     · Call 911 or have someone take you to the Emergency Department if you have any of the following:  · Sudden numbness or weakness of your face, arm or leg especially on one side of the body  · Sudden confusion, difficulty speaking or trouble understanding   · Changes in your vision or loss of sight in one eye  · Sudden severe headache with no known cause  · Sudden dizziness, trouble walking, loss of balance or coordination     It is important to seek emergency care right away if you have further stroke symptoms. If you get emergency help quickly, the powerful clot-dissolving medicines can reduce the disabilities caused by a stroke.      For more information:  American Stroke Association  5-452-5-STROKE  www.strokeassociation.org  Smoking Cessation     IF YOU SMOKE OR USE TOBACCO PLEASE READ THE FOLLOWING:  Why is smoking bad for me?  Smoking increases the risk of heart disease, lung disease, vascular disease, stroke, and cancer. If you smoke, STOP!     If you would like more information on quitting smoking, please visit the [employer] website: [email or web address]   This link will provide additional resources including the QUIT line and the Beat the Pack support groups.      For more " information:  Quit Now [place]  1-800-QUIT-NOW  https://[place].quitlogix.org/en-[place]/  Suicidal Feelings     If you feel like life is too tough and are thinking of suicide or injuring yourself, get help right away!  · Call 911  · Call a suicide hotline to speak to a counselor. 7-054-630-TALK or 8-116-NMXCKUK   Patient Experience     Thank you for choosing [facility]. You may receive a survey following your visit. Please take a moment to share what went well, where we need improvement, and which staff members deserve recognition. We value your input.           YOU ARE THE MOST IMPORTANT FACTOR IN YOUR RECOVERY.      Follow all instructions carefully.      I have reviewed my discharge instructions with my nurse, including the following information, if applicable:                 Information about my illness and diagnosis              Follow up appointments (including lab draws)              Wound Care              Equipment Needs              Medications (new and continuing) along with side effects              Preventative information such as vaccines and smoking cessations              Diet              Pain              I know when to contact my Doctor's office or seek emergency care        I want my nurse to describe the side effects of my medications: YES NO   If the answer is no, I understand the side effects of my medications: YES NO   My nurse described the side effects of my medications in a way that I could understand: YES NO   I have taken my personal belongings and my own medications with me at discharge: YES NO       I have received this information and my questions have been answered. I have discussed any concerns I see with this plan with the nurse or physician. I understand these instructions.     Signature of Patient or Responsible Person: _____________________________________     Date: _________________  Time: __________________     Signature of Healthcare Provider:  _______________________________________  Date: _________________  Time: __________________            Emergency Contact Information     Name Relation Home Work Mobile    YEYO BERNSTEIN Spouse [phone]            Insurance Information                MEDICARE/MEDICARE A & B Phone: [phone]    Subscriber: Sachin Bernstein Subscriber#: [id number]    Group#:  Precert#:         EVEREST MEDICARE SUPPLEMENT/EVEREST MEDICAL SUPPLEMENT Phone:     Subscriber: Ramiro, Sachin Subscriber#: [id number]    Group#:  Precert#:           Treatment Team     Provider Relationship Specialty Contact    Edwin Early MD Attending, Physician of Record Internal Medicine [phone]    Eve Landry, JULIO Respiratory Therapist --     Cleopatra Gimenez Certified Nursing Assistant --     Phyllis Samano, PT Physical Therapist Physical Therapy     Val De Los Santos, RN Registered Nurse --     Yamini Bates CNA Unit [place] --           Problem List           Codes Noted - Resolved       Hospital    Constipation ICD-10-CM: K59.00  ICD-9-CM: 564.00 7/10/2019 - Present             History & Physical      Sherri Katz PA at 7/10/2019  3:16 PM     Attestation signed by Edwin Early MD at 7/10/2019  6:24 PM    I have reviewed the history and physical above and agree with the plan.  Overall have low suspicion for active ACS or other active worsening cardiac condition at this time though given history places patient at slightly higher risk.  Initial troponins, CXR and EKG unremarkable, repeat echo pending to assess new wall motion abnormalities or other valvular or structural heart issues.  We will obtain records from cardiologist for comparison.                         [facility]    Patient Identification:  Name:  Sachin Bernstein  Age:  63 y.o.  Sex:  male  :  1955  MRN:  [id number]   Visit Number:  [id number]  Primary Care Physician:  John Rios MD      7/10/2019 10:18 AM    Subjective     Chief complaint:   Chief Complaint   Patient presents with   • Hypotension   • Constipation     History of presenting illness:   Mr. Rubio is a 63 y.o. male with past medical history significant for advanced ischemic cardiomyopathy s/p AICD implantation about 4-6 years ago, CAD s/p stenting x5, hypertension, dyslipidemia, and type 2 diabetes mellitus.  He follows with Dr. Alberts at [facility] for his cardiac care. He presented to the emergency department of [facility] on 7/10/2019 with complaints of constipation and low BP.  He went to see his PCP, Dr. Rios, this morning for routine follow-up.  He was noted to be hypotensive with reported systolic blood pressure in the 80s.  He was instructed to come to the ED for further evaluation.  He denies any recent vomiting, but he has had nausea and indigestion.  He has had several weeks of generalized weakness, fatigue, and severe constipation. He doesn't take any pain medications at home. He reports being admitted to [facility] for 4 days a few weeks ago where he was diagnosed with acute bronchitis. He was also having issues with his bowels at that time.  At discharge, he was set up with home health with physical therapy, which had not started yet. Since then, he returned to [facility] about 1 week ago with complaints of constipation.  He was given a dose of lactulose and prescribed some enemas, which he apparently did not fill.  He was taking some Lasix regularly, but this was discontinued about 2 weeks ago.     Upon arrival to the ED, vitals were BP 93/64, respirations 18, heart rate 84, SPO2 97% on room air, afebrile.  proBNP is 2289.  Potassium 3.3.  WBC count is within normal limits.  UA shows dark urine with 1+ ketones and 1+ bilirubin.  Urine toxicology screen is unremarkable.  He had an acute abdominal series which showed atelectasis of the left lower lung.  There is moderate  "constipation with no evidence of bowel obstruction.  While in the ED, he developed complaints of chest discomfort.  He reports that he \"cannot describe it\".  It was located in the central chest.  No radiation to the arm, neck, or back.  It resolved quickly on its own.  He has not been having chest pain at home recently.  He has noted shortness of breath with mild to moderate exertion.  His wife reports generalized weakness for the last month.  No pedal edema.  No cough, fever, or chills. Patient has been admitted to the observation unit of [facility] for further evaluation and therapy.   ---------------------------------------------------------------------------------------------------------------------   Review of Systems   Constitutional: Positive for activity change, appetite change and fatigue. Negative for chills, diaphoresis and fever.   HENT: Negative for congestion, nosebleeds and postnasal drip.    Respiratory: Positive for shortness of breath. Negative for cough and wheezing.    Cardiovascular: Positive for chest pain. Negative for palpitations and leg swelling.   Gastrointestinal: Positive for abdominal distention, abdominal pain (Generalized discomfort and indigestion. ), constipation and nausea. Negative for diarrhea and vomiting.   Genitourinary: Negative for difficulty urinating, dysuria and hematuria.   Musculoskeletal: Positive for gait problem (Unsteady. ).   Skin: Negative for color change, pallor and rash.   Neurological: Positive for weakness (Generalized. ). Negative for dizziness and syncope.   Psychiatric/Behavioral: Negative for agitation, behavioral problems and confusion.      ---------------------------------------------------------------------------------------------------------------------   Past Medical History:   Diagnosis Date   • CHF (congestive heart failure) (CMS/[facility])    • GERD (gastroesophageal reflux disease)    • Hyperlipidemia    • Hypertension    • Myocardial " infarction (CMS/HCC)    • Pneumonia      Past Surgical History:   Procedure Laterality Date   • CARDIAC CATHETERIZATION     • FOOT SURGERY     • PACEMAKER IMPLANTATION     • SHOULDER SURGERY       No family history on file.  Social History     Socioeconomic History   • Marital status:      Spouse name: Not on file   • Number of children: Not on file   • Years of education: Not on file   • Highest education level: Not on file   Tobacco Use   • Smoking status: Never Smoker   • Smokeless tobacco: Never Used   Substance and Sexual Activity   • Alcohol use: No     Frequency: Never   • Drug use: No     ---------------------------------------------------------------------------------------------------------------------   Allergies:  Patient has no known allergies.  ---------------------------------------------------------------------------------------------------------------------   Prior to Admission Medications     Prescriptions Last Dose Informant Patient Reported? Taking?    apixaban (ELIQUIS) 5 MG tablet tablet   Yes No    Take 5 mg by mouth 2 (Two) Times a Day.    aspirin 81 MG chewable tablet   Yes No    Chew 81 mg Daily.    atorvastatin (LIPITOR) 40 MG tablet   Yes No    Take 20 mg by mouth Daily.    carvedilol (COREG) 25 MG tablet   Yes No    Take 25 mg by mouth 2 (Two) Times a Day With Meals.    cetirizine (zyrTEC) 10 MG tablet   Yes No    Take 10 mg by mouth Daily.    furosemide (LASIX) 20 MG tablet   Yes No    Take 20 mg by mouth 2 (Two) Times a Day.    glyBURIDE (DIAbeta) 2.5 MG tablet   Yes No    Take 2.5 mg by mouth Daily With Breakfast.    isosorbide mononitrate (IMDUR) 30 MG 24 hr tablet   Yes No    Take 30 mg by mouth Daily.    lisinopril (PRINIVIL,ZESTRIL) 20 MG tablet   Yes No    Take 20 mg by mouth Daily.    nitroglycerin (NITROSTAT) 0.4 MG SL tablet   Yes No    Place 0.4 mg under the tongue Every 5 (Five) Minutes As Needed for Chest Pain. Take no more than 3 doses in 15 minutes.    ondansetron  ODT (ZOFRAN-ODT) 4 MG disintegrating tablet   No No    Take 1 tablet by mouth Every 6 (Six) Hours As Needed for Nausea.    pantoprazole (PROTONIX) 40 MG EC tablet   Yes No    Take 40 mg by mouth Daily.    Probiotic Product (DIGESTIVE ADVANTAGE PO)   Yes No    Take  by mouth.    spironolactone (ALDACTONE) 25 MG tablet   Yes No    Take 12.5 mg by mouth Daily.        ---------------------------------------------------------------------------------------------------------------------  Objective   Hospital Scheduled Meds:    aspirin 324 mg Oral Once        ---------------------------------------------------------------------------------------------------------------------   Vital Signs:  Temp:  [98.2 °F (36.8 °C)] 98.2 °F (36.8 °C)  Heart Rate:  [84] 84  Resp:  [18] 18  BP: (93)/(64) 93/64  No data found.  SpO2 Percentage    07/10/19 1004   SpO2: 97%     SpO2:  [97 %] 97 %  on   ;   Device (Oxygen Therapy): room air    Body mass index is 18.52 kg/m².  Wt Readings from Last 3 Encounters:   07/10/19 54.4 kg (120 lb)   05/05/19 63.5 kg (140 lb)   08/29/14 78.5 kg (172 lb 15.9 oz)   ---------------------------------------------------------------------------------------------------------------------   Physical Exam:  Constitutional: Chronically ill appearing, thin  male, resting comfortably in bed. No acute distress on room air.    HENT:  Head: Normocephalic and atraumatic.  Mouth: Dry with peeling skin on his lips.  Eyes:  Conjunctivae and EOM are normal. No scleral icterus. No erythema or drainage.  Neck:  Neck supple.  No JVD present. No carotid bruits noted.   Cardiovascular:  Normal rate, regular rhythm and normal heart sounds with no murmur.  Pulmonary/Chest:  No respiratory distress, no wheezes. Few crackles at the left base.  Abdominal:  Soft.  Bowel sounds are present x4.  No distension and no tenderness.   Musculoskeletal:  Thin, no edema, no tenderness, and no deformity.  No red or swollen joints anywhere.     Neurological:  Alert and oriented to person, place, and time.  No cranial nerve deficit.  No tongue deviation.  No facial droop.  No slurred speech.   Skin:  Skin is warm and dry.  No rash noted.  No pallor.   Psychiatric:  Normal mood and affect.  Behavior is normal.  Judgment and thought content normal.   Peripheral vascular:  No edema and 2+ pedal pulses bilaterally.   Genitourinary: No marie catheter in place.   ---------------------------------------------------------------------------------------------------------------------  EKG:  Pending cardiology interpretation. Per my read, reveals sinus rhythm at 65 bpm, PVC x1.  Possible old anteroseptal MI.  Nonspecific lateral T wave abnormalities.  QTc 465 ms.    Telemetry: Telemetry currently only artifact.     I have personally reviewed the EKG/Telemetry strip.  ---------------------------------------------------------------------------------------------------------------------   Results from last 7 days   Lab Units 07/10/19  1401 07/10/19  1044   TROPONIN T ng/mL <0.010 <0.010     Results from last 7 days   Lab Units 07/10/19  1044   PROBNP pg/mL 2,289.0*         Results from last 7 days   Lab Units 07/10/19  1044   WBC 10*3/mm3 9.10   HEMOGLOBIN g/dL 12.9*   HEMATOCRIT % 40.3   MCV fL 86.5   MCHC g/dL 32.0   PLATELETS 10*3/mm3 233     Results from last 7 days   Lab Units 07/10/19  1044   SODIUM mmol/L 144   POTASSIUM mmol/L 3.3*   CHLORIDE mmol/L 105   CO2 mmol/L 23.5   BUN mg/dL 14   CREATININE mg/dL 0.75*   EGFR IF NONAFRICN AM mL/min/1.73 105   CALCIUM mg/dL 9.2   GLUCOSE mg/dL 81   ALBUMIN g/dL 3.34*   BILIRUBIN mg/dL 0.9   ALK PHOS U/L 101   AST (SGOT) U/L 16   ALT (SGPT) U/L 7   Estimated Creatinine Clearance: 70.8 mL/min (A) (by C-G formula based on SCr of 0.75 mg/dL (L)).  No results found for: AMMONIA    No results found for: HGBA1C, POCGLU  No results found for: HGBA1C  No results found for: TSH, FREET4    Pain Management Panel     Pain Management  Panel Latest Ref Rng & Units 7/10/2019    AMPHETAMINES SCREEN, URINE Negative Negative    BARBITURATES SCREEN Negative Negative    BENZODIAZEPINE SCREEN, URINE Negative Negative    BUPRENORPHINEUR Negative Negative    COCAINE SCREEN, URINE Negative Negative    METHADONE SCREEN, URINE Negative Negative        I have personally reviewed the above laboratory results.   ---------------------------------------------------------------------------------------------------------------------  Imaging Results (last 7 days)     Procedure Component Value Units Date/Time    XR Abdomen 2 View With Chest 1 View [[id number]] Collected:  07/10/19 1124     Updated:  07/10/19 1127    Narrative:       EXAMINATION: XR ABDOMEN 2 VW W CHEST 1 VW-      CLINICAL INDICATION:     abd pain, SOB     TECHNIQUE:  XR ABDOMEN 2 VW W CHEST 1 VW-      COMPARISON: 05/05/2019      FINDINGS:   Stable left cardiac pacer device.  Linear atelectasis left lower lung.   Heart size, mediastinum, and pulmonary vascularity are unremarkable.   No pneumothorax.   No effusions.   No acute osseous findings.  Moderate constipation is noted. No evidence of bowel obstruction.  No pathologic calcifications identified.       Impression:       1. Left basilar atelectasis. Otherwise stable chest.  2. Moderate constipation. No bowel obstruction.        This report was finalized on 7/10/2019 11:25 AM by Dr. Alex Benavides MD.           I have personally reviewed the above radiology results.   ---------------------------------------------------------------------------------------------------------------------  Assessment & Plan      -Chest pain/Dyspnea: Vague history. Currently asymptomatic. EKG is without acute changes compared to 05/2019. He received aspirin 324 mg x 1 in the ED.  Continue aspirin 81 mg daily.  Check fasting lipid panel in a.m.  Resume home atorvastatin. No beta blocker for now secondary to baseline hypotension. Follow-up on serial troponins.  Check  transthoracic echocardiogram.  I have requested the most recent discharge summary and any cardiac studies from [facility] recent admission.  I have also requested the last office visit from his primary cardiologist, Dr. Alberts.  If admission is uneventful, troponins remain negative, and echocardiogram does not show any new significant abnormalities, will discharge home in a.m. to follow-up with his primary cardiologist. The patient does not want a stress test unless he absolutely has to.     -Hypotension: Resolved with IV fluids.  Likely secondary to dehydration.  Hold home antihypertensive medications/diuretics for now.  Place on light IV fluids with normal saline at 50 cc/h.     -Hypokalemia: Replace orally per protocol. Check magnesium and replace if needed. Repeat BMP in AM.     -Constipation: He was disimpacted in the ED. Moderate constipation on XR with no bowel obstruction. IVF as noted above. Place on Miralax 17g daily. Sennakot 2 tablets daily. PRN dulcolax suppository.     -Advanced ischemic cardiomyopathy status post AICD implantation 4-6 years: EF reportedly 22%, no recent echocardiogram per the patient and his wife. Echo pending as noted above.  Currently appears volume depleted.  Cautious with IV fluids.     -Coronary artery disease s/p stenting x5: Complete details unavailable, last intervention reported to have been at least 2 years ago. Continue aspirin/statin. Evaluation as noted above.     -Generalized weakness/fatigue: Likely due to chronic illness and recent hospital admission. Check TSH. Continue IV fluids. Nutrition consult for malnutrition severity assessment. He has home health with PT arranged, which was to start today.     -Dyslipidemia: Continue home statin, lipid panel in a.m.    -Type 2 diabetes mellitus: Recently poor oral intake with nausea and constipation.  Glucose 81 while in the ED.  Check hemoglobin A1c level.  We will hold off on sliding scale insulin for now  "given poor oral intake.  Accu-Cheks before meals and at bedtime.    -Chronic anticoagulation with Eliquis: Unclear indication. Wife reports that he may have had Afib in the past, but she is unsure. She states that he had a \"blood clot in his heart\" at one point as well. Cardiology records requested.     DVT Prophylaxis: Eliquis.     The patient is considered to be a high risk patient due to: Hypotension, Advanced cardiomyopathy, known CAD.     Code Status: FULL CODE.     I have discussed the patient's assessment and plan with the patient, his wife at bedside, and admitting physician, Dr. Early.     BRISA Blakely  07/10/19  3:16 PM  ---------------------------------------------------------------------------------------------------------------------       Electronically signed by Edwin Early MD at 7/10/2019  6:24 PM       ICU Vital Signs     Row Name 07/12/19 1203 07/12/19 0757 07/12/19 0720 07/12/19 0540 07/12/19 0443       Vitals    Temp  98.6 °F (37 °C)  98.3 °F (36.8 °C)  --  --  --    Temp src  Oral  Oral  --  --  --    Pulse  77  78  --  --  67    Heart Rate Source  Monitor  Monitor  --  --  Monitor    Resp  18  18  --  --  18    Resp Rate Source  Visual  Visual  --  --  Visual    BP  105/57  114/74  --  --  92/63    Noninvasive MAP (mmHg)  81  92  --  --  75    BP Location  Right arm  Right arm  --  --  Right arm    BP Method  Automatic  Automatic  --  --  Automatic    Patient Position  Lying  Sitting  --  --  Lying       Oxygen Therapy    SpO2  95 %  94 %  --  98 %  99 %    Pulse Oximetry Type  Continuous  Continuous  --  --  --    Device (Oxygen Therapy)  room air  room air  room air  room air  --    Row Name 07/12/19 0013 07/11/19 2017 07/11/19 1934 07/11/19 1714          Vitals    Temp  97.2 °F (36.2 °C)  97.7 °F (36.5 °C)  --  --     Temp src  Axillary  Axillary  --  --     Pulse  65  73  --  81     Heart Rate Source  Monitor  Monitor  --  Monitor     Resp  18  18  --  18     Resp Rate Source  " Visual  Visual  --  Visual     BP  107/65  112/67  --  103/67     Noninvasive MAP (mmHg)  85  83  --  79     BP Location  Right arm  Right arm  --  Right arm     BP Method  Automatic  Automatic  --  Automatic     Patient Position  Lying  Lying  --  Lying        Oxygen Therapy    SpO2  98 %  97 %  --  98 %     Pulse Oximetry Type  --  --  --  Continuous     Device (Oxygen Therapy)  --  --  room air  room air         Intake & Output (last day)       07/11 0701 - 07/12 0700 07/12 0701 - 07/13 0700    P.O. 636 354    IV Piggyback      Total Intake(mL/kg) 636 (11.7) 354 (6.5)    Urine (mL/kg/hr) 650 (0.5) 250 (0.8)    Stool 0 0    Total Output 650 250    Net -14 +104          Urine Unmeasured Occurrence  2 x    Stool Unmeasured Occurrence 2 x 2 x          Lab Results (last 24 hours)     Procedure Component Value Units Date/Time    Magnesium [[id number]]  (Normal) Collected:  07/12/19 0945    Specimen:  Blood Updated:  07/12/19 1031     Magnesium 2.4 mg/dL     Comprehensive Metabolic Panel [[id number]]  (Abnormal) Collected:  07/12/19 0945    Specimen:  Blood Updated:  07/12/19 1031     Glucose 144 mg/dL      BUN 6 mg/dL      Creatinine 0.71 mg/dL      Sodium 138 mmol/L      Potassium 3.6 mmol/L      Chloride 102 mmol/L      CO2 21.9 mmol/L      Calcium 8.9 mg/dL      Total Protein 6.3 g/dL      Albumin 3.32 g/dL      ALT (SGPT) 7 U/L      AST (SGOT) 16 U/L      Alkaline Phosphatase 101 U/L      Total Bilirubin 0.7 mg/dL      eGFR Non African Amer 111 mL/min/1.73      Globulin 3.0 gm/dL      A/G Ratio 1.1 g/dL      BUN/Creatinine Ratio 8.5     Anion Gap 14.1 mmol/L     Narrative:       GFR Normal >60  Chronic Kidney Disease <60  Kidney Failure <15    CBC & Differential [[id number]] Collected:  07/12/19 0945    Specimen:  Blood Updated:  07/12/19 1003    Narrative:       The following orders were created for panel order CBC & Differential.  Procedure                               Abnormality         Status                      ---------                               -----------         ------                     CBC Auto Differential[[id number]]        Abnormal            Final result                 Please view results for these tests on the individual orders.    CBC Auto Differential [[id number]]  (Abnormal) Collected:  07/12/19 0945    Specimen:  Blood Updated:  07/12/19 1003     WBC 9.38 10*3/mm3      RBC 4.88 10*6/mm3      Hemoglobin 13.6 g/dL      Hematocrit 42.7 %      MCV 87.5 fL      MCH 27.9 pg      MCHC 31.9 g/dL      RDW 15.4 %      RDW-SD 49.3 fl      MPV 11.1 fL      Platelets 248 10*3/mm3      Neutrophil % 80.2 %      Lymphocyte % 12.9 %      Monocyte % 4.8 %      Eosinophil % 1.4 %      Basophil % 0.5 %      Immature Grans % 0.2 %      Neutrophils, Absolute 7.52 10*3/mm3      Lymphocytes, Absolute 1.21 10*3/mm3      Monocytes, Absolute 0.45 10*3/mm3      Eosinophils, Absolute 0.13 10*3/mm3      Basophils, Absolute 0.05 10*3/mm3      Immature Grans, Absolute 0.02 10*3/mm3     POC Glucose Once [[id number]]  (Normal) Collected:  07/12/19 0720    Specimen:  Blood Updated:  07/12/19 0734     Glucose 101 mg/dL     POC Glucose Once [[id number]]  (Abnormal) Collected:  07/11/19 2021    Specimen:  Blood Updated:  07/12/19 0423     Glucose 137 mg/dL     POC Glucose Once [[id number]]  (Normal) Collected:  07/11/19 1644    Specimen:  Blood Updated:  07/11/19 1705     Glucose 125 mg/dL     POC Glucose Once [[id number]]  (Normal) Collected:  07/11/19 0723    Specimen:  Blood Updated:  07/11/19 1649     Glucose 73 mg/dL     POC Glucose Once [[id number]]  (Abnormal) Collected:  07/11/19 0649    Specimen:  Blood Updated:  07/11/19 1648     Glucose 68 mg/dL     Phosphorus [[id number]]  (Normal) Collected:  07/11/19 0131    Specimen:  Blood Updated:  07/11/19 1610     Phosphorus 2.5 mg/dL     Magnesium [[id number]]  (Normal) Collected:  07/11/19 0131    Specimen:  Blood Updated:  07/11/19 1533     Magnesium 1.7 mg/dL         Imaging Results (last  24 hours)     ** No results found for the last 24 hours. **        ECG/EMG Results (last 24 hours)     Procedure Component Value Units Date/Time    Adult Transthoracic Echo Complete W/ Cont if Necessary Per Protocol [[id number]] Collected:  07/11/19 1427     Updated:  07/11/19 1704     BSA 1.6 m^2      LVOT diam 2.0 cm      LVOT area 3.1 cm^2      LVOT area(traced) 3.1 cm^2      LVLd ap4 9.0 cm      EDV(MOD-sp4) 106.0 ml      LVLs ap4 8.7 cm      ESV(MOD-sp4) 58.0 ml      EF(MOD-sp4) 45.3 %      SV(MOD-sp4) 48.0 ml      SI(MOD-sp4) 29.6 ml/m^2      LV Garibay Vol (BSA corrected) 65.4 ml/m^2      LV Sys Vol (BSA corrected) 35.8 ml/m^2      MV E max иван 41.1 cm/sec      MV A max иван 93.2 cm/sec      MV E/A 0.44     Ao pk иван 183.0 cm/sec      Ao max PG 13.4 mmHg      Ao max PG (full) 3.2 mmHg      Ao V2 mean 110.0 cm/sec      Ao mean PG 6.0 mmHg      Ao mean PG (full) 1.0 mmHg      Ao V2 VTI 31.9 cm      KANU(I,A) 3.3 cm^2      KANU(I,D) 3.3 cm^2      KANU(V,A) 2.7 cm^2      KANU(V,D) 2.7 cm^2      LV V1 max PG 10.2 mmHg      LV V1 mean PG 5.0 mmHg      LV V1 max 160.0 cm/sec      LV V1 mean 105.0 cm/sec      LV V1 VTI 33.8 cm      SV(LVOT) 106.2 ml      SI(LVOT) 65.5 ml/m^2      TR max иван 248.0 cm/sec      RVSP(TR) 34.6 mmHg      RAP systole 10.0 mmHg       CV ECHO HAILEY - BZI_BMI 18.6 kilograms/m^2       CV ECHO HAILEY - BSA([facility] [place]) 1.6 m^2       CV ECHO HAILEY - BZI_METRIC_WEIGHT 54.0 kg       CV ECHO HAILEY - BZI_METRIC_HEIGHT 170.2 cm      Target HR (85%) 132 bpm      Max. Pred. HR (100%) 155 bpm      Echo EF Estimated 35 %     Narrative:       · The left ventricular cavity is moderately dilated.  · Estimated EF = 35%.  · The following left ventricular wall segments are akinetic: apical   inferior, apical septal and apex.  · There is calcification of the aortic valve mainly affecting the non and   left coronary cusp(s).  · Mild aortic valve stenosis is present.  · Finding consistent with ischemic cardiomyopathy with  apical akinesis and   reduced LV function. There is a lead in the RV.             Orders (last 24 hrs)     Start     Ordered    07/13/19 0000  polyethylene glycol (MIRALAX) pack packet  Daily      07/12/19 1231    07/12/19 1300  potassium chloride (K-DUR,KLOR-CON) CR tablet 40 mEq  Every 4 Hours      07/12/19 1136    07/12/19 1230  Discontinue IV  Once      07/12/19 1231    07/12/19 1230  Discontinue Telemetry  Once      07/12/19 1231    07/12/19 1226  Discharge patient  Once      07/12/19 1231    07/12/19 0924  CBC & Differential  STAT      07/12/19 0923    07/12/19 0924  Comprehensive Metabolic Panel  STAT      07/12/19 0923    07/12/19 0924  Magnesium  Once      07/12/19 0923    07/12/19 0924  CBC Auto Differential  PROCEDURE ONCE      07/12/19 0923    07/12/19 0735  POC Glucose Once  Once      07/12/19 0720    07/12/19 0424  POC Glucose Once  Once      07/11/19 2021    07/12/19 0000  atorvastatin (LIPITOR) 20 MG tablet  Nightly      07/12/19 1231    07/12/19 0000  sennosides-docusate sodium (SENOKOT-S) 8.6-50 MG tablet  Nightly      07/12/19 1231    07/12/19 0000  Discharge Follow-up with PCP      07/12/19 1231    07/12/19 0000  Discharge Follow-up with Specified Provider: Dr. Alberts ([facility]) 2-3 days      07/12/19 1231    07/12/19 0000  Diet: Regular, Cardiac      07/12/19 1231    07/12/19 0000  Walker      07/12/19 1231    07/11/19 1706  POC Glucose Once  Once      07/11/19 1644    07/11/19 1700  lactulose (CHRONULAC) 10 GM/15ML solution 10 g  Once      07/11/19 1553    07/11/19 1700  Magnesium Sulfate 4 gram infusion- Mg 1.6-1.9 mg/dL  Once      07/11/19 1602    07/11/19 1650  POC Glucose Once  Once      07/11/19 0723    07/11/19 1649  POC Glucose Once  Once      07/11/19 0649    07/11/19 1555  PT Consult: Eval & Treat debility/functional decline  Once      07/11/19 1555    07/11/19 1554  Magnesium Sulfate 2 gram Bolus, followed by 8 gram infusion (total Mg dose 10 grams)- Mg less than or equal to  1mg/dL  As Needed      07/11/19 1554    07/11/19 1554  Magnesium Sulfate 2 gram / 50mL Infusion (GIVE X 3 BAGS TO EQUAL 6GM TOTAL DOSE) - Mg 1.1 - 1.5 mg/dl  As Needed      07/11/19 1554    07/11/19 1554  Magnesium Sulfate 4 gram infusion- Mg 1.6-1.9 mg/dL  As Needed      07/11/19 1554    07/11/19 1553  potassium chloride (K-DUR,KLOR-CON) CR tablet 40 mEq  As Needed      07/11/19 1554    07/11/19 1553  potassium chloride (KLOR-CON) packet 40 mEq  As Needed      07/11/19 1554    07/11/19 1553  potassium chloride 10 mEq in 100 mL IVPB  Every 1 Hour PRN      07/11/19 1554    07/11/19 1552  Phosphorus  Once      07/11/19 1551    07/11/19 1524  Magnesium  Once      07/11/19 1523    07/10/19 2100  sodium chloride 0.9 % flush 3 mL  Every 12 Hours Scheduled      07/10/19 1640    07/10/19 2100  atorvastatin (LIPITOR) tablet 20 mg  Nightly      07/10/19 1639    07/10/19 2100  apixaban (ELIQUIS) tablet 5 mg  Every 12 Hours Scheduled      07/10/19 1844    07/10/19 1900  dextrose (GLUTOSE) oral gel 15 g  Every 15 Minutes PRN      07/10/19 1844    07/10/19 1900  dextrose (D50W) 25 g/ 50mL Intravenous Solution 25 g  Every 15 Minutes PRN      07/10/19 1844    07/10/19 1900  glucagon (human recombinant) (GLUCAGEN DIAGNOSTIC) injection 1 mg  Every 15 Minutes PRN      07/10/19 1844    07/10/19 1800  polyethylene glycol 3350 powder (packet)  Daily      07/10/19 1639    07/10/19 1730  sodium chloride 0.9 % infusion  Continuous      07/10/19 1639    07/10/19 1730  sennosides-docusate sodium (SENOKOT-S) 8.6-50 MG tablet 2 tablet  Nightly      07/10/19 1639    07/10/19 1700  POC Glucose Finger 4x Daily AC & at Bedtime  4 Times Daily Before Meals & at Bedtime      07/10/19 1639    07/10/19 1640  sodium chloride 0.9 % flush 3-10 mL  As Needed      07/10/19 1640    07/10/19 [facility]  potassium chloride (MICRO-K) CR capsule 40 mEq  As Needed      07/10/19 [facility]    07/10/19 [facility]  potassium chloride (KLOR-CON) packet 40 mEq  As Needed      07/10/19  1640    07/10/19 1639  bisacodyl (DULCOLAX) suppository 10 mg  Daily PRN      07/10/19 1639    07/10/19 1639  glycerin 35 % liquid 35% 2 spray  Every 2 Hours PRN      07/10/19 1639    07/10/19 1027  sodium chloride 0.9 % flush 10 mL  As Needed      07/10/19 1028    Unscheduled  Magnesium  As Needed      07/10/19 1640    Unscheduled  Potassium  As Needed      07/10/19 1640    Unscheduled  Magnesium  As Needed      19 1554    Unscheduled  Potassium  As Needed      19 1554    Signed and Held  heparin (porcine) 5000 UNIT/ML injection 5,000 Units  Every 8 Hours Scheduled,   Status:  Canceled      Signed and Held    --  lisinopril (PRINIVIL,ZESTRIL) 5 MG tablet  Daily      07/10/19 1718    --  carvedilol (COREG) 3.125 MG tablet  2 Times Daily With Meals      07/10/19 1718             Physician Progress Notes (last 24 hours) (Notes from 2019 12:59 PM through 2019 12:59 PM)      Bess Burns APRN at 2019  3:19 PM     Attestation signed by Edwin Early MD at 2019  5:01 PM    I have reviewed the documentation above and agree. Patient stable, echo pending, treating constipation, awaiting records from OSH.                    Patient Identification:  Name:  Sachin Rubio  Age:  63 y.o.  Sex:  male  :  1955  MRN:  [id number]  Visit Number:  [id number]  Primary Care Provider:  John Rios MD    Length of stay:  0    Chief Complaint: constipation     HPI:    Mr. Rubio is a 63 year old male patient who was admitted to the observation unit on 7/10/19 for constipation from the ED. His past medical history is significant for CAD s/p stent x5, Advanced Ischemic Cardiomyopathy s/p AICD around 4-6 years ago, DM Type 2, and dyslipidemia.     Subjective:      Mr. Rubio is sitting up in the bedside chair on my exam today, he denies any abdominal pain, no nausea or vomiting. He denies any dyspnea or chest pain. His wife is at bedside during exam. Discussed with BRANDY evangelista.    ----------------------------------------------------------------------------------------------------------------------  Current Hospital Meds:    apixaban 5 mg Oral Q12H   atorvastatin 20 mg Oral Nightly   lactulose 10 g Oral Once   magnesium sulfate 4 g Intravenous Once   polyethylene glycol 17 g Oral Daily   sennosides-docusate sodium 2 tablet Oral Nightly   sodium chloride 3 mL Intravenous Q12H       sodium chloride 50 mL/hr Last Rate: 50 mL/hr (07/11/19 1604)     ----------------------------------------------------------------------------------------------------------------------  Vital Signs:  Temp:  [98 °F (36.7 °C)-98.5 °F (36.9 °C)] 98.1 °F (36.7 °C)  Heart Rate:  [65-73] 72  Resp:  [18] 18  BP: ()/(58-65) 94/62      07/10/19  1004 07/10/19  1621   Weight: 54.4 kg (120 lb) 54.3 kg (119 lb 9.6 oz)     Body mass index is 18.73 kg/m².    Intake/Output Summary (Last 24 hours) at 7/11/2019 2107  Last data filed at 7/11/2019 0900  Gross per 24 hour   Intake 0 ml   Output --   Net 0 ml     No intake/output data recorded.  Diet Regular; Cardiac  ----------------------------------------------------------------------------------------------------------------------  Physical exam:  Constitutional:  Pale, thin chronically ill appearing male.   HENT:  Head:  Normocephalic and atraumatic.  Mouth:  Moist mucous membranes.    Eyes:Pupils are equal, round, and reactive to light.  No scleral icterus.    Neck:  Neck supple.  No JVD present.    Cardiovascular:  Normal rate, regular rhythm and normal heart sounds with no murmur.  Pulmonary/Chest:  No respiratory distress, no wheezes, no crackles, with normal breath sounds and good air movement.  Abdominal:  Soft.  Bowel sounds are normal.  No distension and no tenderness.   Musculoskeletal:  No edema, no tenderness, and no deformity.  No red or swollen joints anywhere.    Neurological:  Alert and oriented to person, place, and time.   No tongue deviation.  No facial  droop.  No slurred speech.   Skin:  Skin is warm and dry. No rash noted. No pallor.   ----------------------------------------------------------------------------------------------------------------------  Tele:        ----------------------------------------------------------------------------------------------------------------------  Results from last 7 days   Lab Units 07/10/19  1401 07/10/19  1044   TROPONIN T ng/mL <0.010 <0.010   PROBNP pg/mL  --  2,289.0*     Results from last 7 days   Lab Units 07/11/19  0131 07/10/19  1044   WBC 10*3/mm3 8.63 9.10   HEMOGLOBIN g/dL 11.4* 12.9*   HEMATOCRIT % 36.1* 40.3   MCV fL 87.8 86.5   MCHC g/dL 31.6 32.0   PLATELETS 10*3/mm3 188 233         Results from last 7 days   Lab Units 07/11/19  0131 07/10/19  1401 07/10/19  1044   SODIUM mmol/L 141  --  144   POTASSIUM mmol/L 3.8  --  3.3*   MAGNESIUM mg/dL 1.7 1.8  --    CHLORIDE mmol/L 108*  --  105   CO2 mmol/L 22.1  --  23.5   BUN mg/dL 13  --  14   CREATININE mg/dL 0.53*  --  0.75*   EGFR IF NONAFRICN AM mL/min/1.73 >150  --  105   CALCIUM mg/dL 8.5*  --  9.2   GLUCOSE mg/dL 72  --  81   ALBUMIN g/dL  --   --  3.34*   BILIRUBIN mg/dL  --   --  0.9   ALK PHOS U/L  --   --  101   AST (SGOT) U/L  --   --  16   ALT (SGPT) U/L  --   --  7   Estimated Creatinine Clearance: 70.7 mL/min (A) (by C-G formula based on SCr of 0.53 mg/dL (L)).  No results found for: AMMONIA  Results from last 7 days   Lab Units 07/11/19  0131   CHOLESTEROL mg/dL 141   TRIGLYCERIDES mg/dL 106   HDL CHOL mg/dL 28*   LDL CHOL mg/dL 92                 ----------------------------------------------------------------------------------------------------------------------  Imaging Results (last 24 hours)     ** No results found for the last 24 hours. **        ----------------------------------------------------------------------------------------------------------------------  Assessment and Plan:    Chest pain/Dyspnea: denies any cp or dyspnea on exam  today, in no distress. Troponin trend was flat. Echo pending. Continue to monitor on telemetry. Awaiting DC summary from [facility]    Hypotension: BP remains borderline at 94/62, continue IV fluids and monitor fluid status closely. No BP meds being given.     Hypokalemia: resolved, potassium is 3.8.     Hypomagnesemia: mag level is 1.7, replacement ordered per protocol, repeat in the am.     Constipation: no bowel movement since disimpaction in the ED. Continue miralax and senna. Lactulose x1 PO ordered. Denies any abdominal pain, abdomen remains soft and non-tender with bowel sounds. Patient reports his last BM was 2-3 days prior to admission. He does have a colonoscopy scheduled with Dr. Herman on 7/31/19 for his history of polyps.     CAD s/p stent, Advanced ischemic Cardiomyopathy s/p AICD: repeat echo done here and read is pending, trop trend negative, follows with cardiology at [facility]. Monitor on telemetry, denies any current chest pain or dyspnea. Reported echo is apparently around 22%, awaiting echo reading, does not appear in fluid overload at this time. BP currently to low for ARB/ACE and beta blocker.     DM Type 2, non-insulin dependent: hypoglycemia protocol initiated, accu checks ac/hs and prn, diabetic diet, no sliding scale ordered at this time.      Chronic Anticoagulation: on eliquis at home, awaiting records from [facility].    Urinary retention: bladder scan at 1pm showed 210ml, repeat is pending, if >300ml of urine on scan will straight cath and repeat bladder scan in 4 hours.     Functional decline/Weakness: PT/OT ordered. Wife reports functional decline over the last 2 months. They are set up with PT home health per his PCP.     DVT prophylaxis: on eliquis PO        Bess Burns, APRN  07/11/19  4:27 PM        Electronically signed by Edwin Early MD at 7/11/2019  5:01 PM       Physical Therapy Notes (most recent note)     No notes of this type exist for this encounter.        Occupational Therapy  Notes (most recent note)     No notes of this type exist for this encounter.             Discharge Summary      Bess Burns, APRN at 2019 12:01 PM              [facility] DISCHARGE SUMMARY    Patient Identification:  Name:  Sachin Rubio  Age:  63 y.o.  Sex:  male  :  1955  MRN:  [id number]  Visit Number:  [id number]    Date of Admission: 7/10/2019  Date of Discharge:  2019     PCP: John Rios MD    DISCHARGE DIAGNOSIS    Chest pain/dyspnea, resolved   HTN with hypotension, resolved   Hypokalemia, resolved   Hypomagnesemia, resolved   Constipation, resolved   CAD s/p stenting   Advanced Ischemic Cardiomyopathy s/p AICD 4-6 years ago, EF 35%  Mild aortic valve stenosis  DM Type 2, non-insulin dependent   Left ventricular apical thrombus   Functional Decline     CONSULTS   none    PROCEDURES PERFORMED        HPI:     Mr. Rubio is a 63 year old male patient who was admitted to the observation unit on 7/10/19 for constipation from the ED. His past medical history is significant for CAD s/p stent x5, Advanced Ischemic Cardiomyopathy s/p AICD around 4-6 years ago, EF of 35%,  Moderate aortic stenosis, left ventricular apical thrombus, DM Type 2 non-insulin dependent, and dyslipidemia.       HOSPITAL COURSE    Mr. Rubio was admitted to the observation unit, troponin T was trended and remained <0.010.  Chest x-ray and KUB was done on admission which showed left basilar atelectasis, moderate constipation with no bowel obstruction.  He was admitted and started on senna at bedtime and MiraLAX daily, the day after admission he had not yet to have a bowel movement, he was given 1 dose of lactulose and has had bowel movements prior to discharge.  Echocardiogram was also done due to his complaint of chest pain, and showed EF of 35%, full report as noted above.  His discharge summary from [facility] from his stay 2019 through 2019 was reviewed, his most  recent EF there was noted to be around 40 to 45%, ours is slightly lower however this could be discrepancy per read, he does have an AICD. He did have some urinary retention yesterday and required a one time straight cath but since having bowel movements he has been able to void on his own. He did walk the halls today with PT and did well with use of walker. His appetite has improved and able to tolerate PO intake/diet.    Discharge:     Mr. Rubio will be discharged home today with his wife.  Bowel regimen discussed with him and his wife.  I have encouraged him to keep his appointment with Dr. Herman on 7/31/2019 for his colonoscopy.  We will also have the staff set him appointment up with his cardiologist  within the next week. I have asked him and his wife to closely monitor his blood pressure 2-3 times a day. He will not be sent home on any ACE/ARB or aldactone due to his borderline hypotension, which will reuqire further follow up and monitoring by his PCP/Cardiologist. Reasons that would warrant a return to the ED discussed. He and his wife both feel he is ready for discharge today. He does have PT home health and nursing care visits set up. He has also had two episodes of slightly lower glucoses, his wife reports he does have a glucometer and supplies at home to check his sugar, I have asked him to do this 2-3 times a day and hypoglycemia treatment discussed.  He will not be sent home on any antidiabetic medications.      VITAL SIGNS:  Temp:  [97.2 °F (36.2 °C)-98.6 °F (37 °C)] 98.6 °F (37 °C)  Heart Rate:  [65-81] 77  Resp:  [18] 18  BP: ()/(57-74) 105/57  SpO2:  [94 %-99 %] 95 %  on   ;   Device (Oxygen Therapy): room air    Body mass index is 18.73 kg/m².  Wt Readings from Last 3 Encounters:   07/10/19 54.3 kg (119 lb 9.6 oz)   05/05/19 63.5 kg (140 lb)   08/29/14 78.5 kg (172 lb 15.9 oz)       PHYSICAL EXAM:    Constitutional:  Pale, thin chronically ill appearing male.   HENT:  Head:   Normocephalic and atraumatic.  Mouth:  Moist mucous membranes.    Eyes:Pupils are equal, round, and reactive to light.  No scleral icterus.    Neck:  Neck supple.  No JVD present.    Cardiovascular:  Normal rate, regular rhythm and normal heart sounds with no murmur.  Pulmonary/Chest:  No respiratory distress, no wheezes, no crackles, with normal breath sounds and good air movement.  Abdominal:  Soft.  Bowel sounds are normal.  No distension and no tenderness.   Musculoskeletal:  No edema, no tenderness, and no deformity.  No red or swollen joints anywhere.    Neurological:  Alert and oriented to person, place, and time.   No tongue deviation.  No facial droop.  No slurred speech.   Skin:  Skin is warm and dry. No rash noted. No pallor.     DISCHARGE DISPOSITION   Stable    DISCHARGE MEDICATIONS:     Discharge Medications      New Medications      Instructions Start Date   atorvastatin 20 MG tablet  Commonly known as:  LIPITOR   20 mg, Oral, Nightly      polyethylene glycol pack packet  Commonly known as:  MIRALAX   17 g, Oral, Daily   Start Date:  7/13/2019     sennosides-docusate sodium 8.6-50 MG tablet  Commonly known as:  SENOKOT-S   2 tablets, Oral, Nightly         Continue These Medications      Instructions Start Date   apixaban 5 MG tablet tablet  Commonly known as:  ELIQUIS   5 mg, Oral, 2 Times Daily         Stop These Medications    carvedilol 3.125 MG tablet  Commonly known as:  COREG     lisinopril 5 MG tablet  Commonly known as:  PRINIVIL,ZESTRIL            Diet Instructions     Diet: Regular, Cardiac      Discharge Diet:   Regular  Cardiac           Additional Instructions for the Follow-ups that You Need to Schedule     Discharge Follow-up with PCP   As directed       Currently Documented PCP:    John Rios MD    PCP Phone Number:    [phone]     Follow Up Details:  2-3 days, repeat CBC, BMP         Discharge Follow-up with Specified Provider: Dr. Alberts ([facility]) 2-3 days   As  directed      To:  Dr. Alberts ([facility]) 2-3 days           Follow-up Information     John Rios MD .    Specialty:  Family Medicine  Why:  2-3 days, repeat CBC, BMP  Contact information:  [address] [place], [place] [id number] [phone]                    TEST  RESULTS PENDING AT DISCHARGE  none     CODE STATUS  Code Status and Medical Interventions:   Ordered at: 07/10/19 1515     Code Status:    CPR     Medical Interventions (Level of Support Prior to Arrest):    Full       OH Eckert  [facility] Hospitalist  07/12/19  12:49 PM    Please note that this discharge summary required more than 30 minutes to complete.      Electronically signed by Bess Burns APRN at 7/12/2019 12:50 PM

## 2019-07-12 NOTE — PLAN OF CARE
Problem: Patient Care Overview  Goal: Plan of Care Review  Outcome: Ongoing (interventions implemented as appropriate)   07/11/19 1945   Plan of Care Review   Progress improving   Coping/Psychosocial   Plan of Care Reviewed With patient       Problem: Skin Injury Risk (Adult)  Goal: Identify Related Risk Factors and Signs and Symptoms  Outcome: Ongoing (interventions implemented as appropriate)   07/11/19 1945   Skin Injury Risk (Adult)   Related Risk Factors (Skin Injury Risk) advanced age;mobility impaired;nutritional deficiencies       Problem: Nausea/Vomiting (Adult)  Goal: Identify Related Risk Factors and Signs and Symptoms  Outcome: Ongoing (interventions implemented as appropriate)   07/10/19 1719   Nausea/Vomiting (Adult)   Related Risk Factors (Nausea/Vomiting) gastrointestinal dysfunction;gastric irritation   Signs and Symptoms (Nausea/Vomiting) dry mucous membranes;food aversion       Problem: Fall Risk (Adult)  Goal: Identify Related Risk Factors and Signs and Symptoms  Outcome: Ongoing (interventions implemented as appropriate)   07/11/19 1945   Fall Risk (Adult)   Related Risk Factors (Fall Risk) age-related changes;gait/mobility problems;environment unfamiliar   Signs and Symptoms (Fall Risk) presence of risk factors

## 2019-07-12 NOTE — PROGRESS NOTES
Discharge Planning Assessment  [facility]     Patient Name: Sachin Rubio  MRN: [id number]  Today's Date: 7/12/2019    Admit Date: 7/10/2019      Discharge Plan     Row Name 07/12/19 1402       Plan    Final Note  Pt is being discharged on this day. [facility]. SS sent [facility] pt's updated information, and gave the nurse Val the number to call report. Pt requested a rolling walker. Ss arranged a rolling walker to be brought to [facility] from [facility]. No other needs identified.            Expected Discharge Date Expected Discharge Time    Jul 12, 2019               YAO Jimenez

## 2019-07-12 NOTE — DISCHARGE PLACEMENT REQUEST
"RamiroSachin cruz (63 y.o. Male)     Date of Birth Social Security Number Address Home Phone MRN    1955  [id number] [address] [phone] [id number]    [employer] Marital Status          None        Admission Date Admission Type Admitting Provider Attending Provider Department, Room/Bed    7/10/19 Emergency Edwin Early MD Parks, Andrew, MD [facility] OBSERVATION UNIT, 214/2S    Discharge Date Discharge Disposition Discharge Destination         Home or Self Care              Attending Provider:  Edwin Early MD    Allergies:  No Known Allergies    Isolation:  None   Infection:  None   Code Status:  CPR    Ht:  170.2 cm (67\")   Wt:  54.3 kg (119 lb 9.6 oz)    Admission Cmt:  None   Principal Problem:  None                Active Insurance as of 7/10/2019     Primary Coverage     Payor Plan Insurance Group Employer/Plan Group    MEDICARE MEDICARE A & B      Payor Plan Address Payor Plan Phone Number Payor Plan Fax Number Effective Dates    [address] [phone]  11/1/2018 - None Entered    [place] [id number]       Subscriber Name Subscriber Birth Date Member ID       SACHIN BERNSTEIN 1955 [id number]           Secondary Coverage     Payor Plan Insurance Group Employer/Plan Group    EVEREST MEDICARE SUPPLEMENT EVEREST MEDICAL SUPPLEMENT      Payor Plan Address Payor Plan Phone Number Payor Plan Fax Number Effective Dates    [address] [address]   5/5/2019 - None Entered    [facility] [id number]       Subscriber Name Subscriber Birth Date Member ID       SACHIN BERNSTEIN 1955 [id number]                 Emergency Contacts      (Rel.) Home Phone Work Phone Mobile Phone    YEYO BENRSTEIN (Spouse) [phone] -- --            Emergency Contact Information     Name Relation Home Work Mobile    YEYO BERNSTEIN Spouse [phone]            Insurance Information                MEDICARE/MEDICARE A & B Phone: [phone]    Subscriber: Sachin Bernstein Subscriber#: " [id number]    Group#:  Precert#:         EVEREST MEDICARE SUPPLEMENT/EVEREST MEDICAL SUPPLEMENT Phone:     Subscriber: Sachin Rubio Subscriber#: [id number]    Group#:  Precert#:           Treatment Team     Provider Relationship Specialty Contact    Edwin Early MD Attending, Physician of Record Internal Medicine [phone]    Eve Landry, RRT Respiratory Therapist --     Cleopatra Gimenez Certified Nursing Assistant --     Phyllis Samano, PT Physical Therapist Physical Therapy     Val De Los Santos, RN Registered Nurse --     Yamini Bates CNA Unit [place] --           Problem List           Codes Noted - Resolved       Hospital    Constipation ICD-10-CM: K59.00  ICD-9-CM: 564.00 7/10/2019 - Present             History & Physical      Sherri Katz PA at 7/10/2019  3:16 PM     Attestation signed by Edwin Early MD at 7/10/2019  6:24 PM    I have reviewed the history and physical above and agree with the plan.  Overall have low suspicion for active ACS or other active worsening cardiac condition at this time though given history places patient at slightly higher risk.  Initial troponins, CXR and EKG unremarkable, repeat echo pending to assess new wall motion abnormalities or other valvular or structural heart issues.  We will obtain records from cardiologist for comparison.                         [facility] HOSPITALIST HISTORY AND PHYSICAL    Patient Identification:  Name:  Sachin Rubio  Age:  63 y.o.  Sex:  male  :  1955  MRN:  [id number]   Visit Number:  [id number]  Primary Care Physician:  John Rios MD     7/10/2019 10:18 AM    Subjective     Chief complaint:   Chief Complaint   Patient presents with   • Hypotension   • Constipation     History of presenting illness:   Mr. Rubio is a 63 y.o. male with past medical history significant for advanced ischemic cardiomyopathy s/p AICD implantation about 4-6 years ago, CAD s/p stenting x5,  "hypertension, dyslipidemia, and type 2 diabetes mellitus.  He follows with Dr. Alberts at [facility] for his cardiac care. He presented to the emergency department of [facility] on 7/10/2019 with complaints of constipation and low BP.  He went to see his PCP, Dr. Rios, this morning for routine follow-up.  He was noted to be hypotensive with reported systolic blood pressure in the 80s.  He was instructed to come to the ED for further evaluation.  He denies any recent vomiting, but he has had nausea and indigestion.  He has had several weeks of generalized weakness, fatigue, and severe constipation. He doesn't take any pain medications at home. He reports being admitted to [facility] for 4 days a few weeks ago where he was diagnosed with acute bronchitis. He was also having issues with his bowels at that time.  At discharge, he was set up with home health with physical therapy, which had not started yet. Since then, he returned to [facility] about 1 week ago with complaints of constipation.  He was given a dose of lactulose and prescribed some enemas, which he apparently did not fill.  He was taking some Lasix regularly, but this was discontinued about 2 weeks ago.     Upon arrival to the ED, vitals were BP 93/64, respirations 18, heart rate 84, SPO2 97% on room air, afebrile.  proBNP is 2289.  Potassium 3.3.  WBC count is within normal limits.  UA shows dark urine with 1+ ketones and 1+ bilirubin.  Urine toxicology screen is unremarkable.  He had an acute abdominal series which showed atelectasis of the left lower lung.  There is moderate constipation with no evidence of bowel obstruction.  While in the ED, he developed complaints of chest discomfort.  He reports that he \"cannot describe it\".  It was located in the central chest.  No radiation to the arm, neck, or back.  It resolved quickly on its own.  He has not been having chest pain at home recently.  He has noted " shortness of breath with mild to moderate exertion.  His wife reports generalized weakness for the last month.  No pedal edema.  No cough, fever, or chills. Patient has been admitted to the observation unit of [facility] for further evaluation and therapy.   ---------------------------------------------------------------------------------------------------------------------   Review of Systems   Constitutional: Positive for activity change, appetite change and fatigue. Negative for chills, diaphoresis and fever.   HENT: Negative for congestion, nosebleeds and postnasal drip.    Respiratory: Positive for shortness of breath. Negative for cough and wheezing.    Cardiovascular: Positive for chest pain. Negative for palpitations and leg swelling.   Gastrointestinal: Positive for abdominal distention, abdominal pain (Generalized discomfort and indigestion. ), constipation and nausea. Negative for diarrhea and vomiting.   Genitourinary: Negative for difficulty urinating, dysuria and hematuria.   Musculoskeletal: Positive for gait problem (Unsteady. ).   Skin: Negative for color change, pallor and rash.   Neurological: Positive for weakness (Generalized. ). Negative for dizziness and syncope.   Psychiatric/Behavioral: Negative for agitation, behavioral problems and confusion.      ---------------------------------------------------------------------------------------------------------------------   Past Medical History:   Diagnosis Date   • CHF (congestive heart failure) (CMS/[facility])    • GERD (gastroesophageal reflux disease)    • Hyperlipidemia    • Hypertension    • Myocardial infarction (CMS/[facility])    • Pneumonia      Past Surgical History:   Procedure Laterality Date   • CARDIAC CATHETERIZATION     • FOOT SURGERY     • PACEMAKER IMPLANTATION     • SHOULDER SURGERY       No family history on file.  Social History     Socioeconomic History   • Marital status:      Spouse name: Not on file   • Number of  children: Not on file   • Years of education: Not on file   • Highest education level: Not on file   Tobacco Use   • Smoking status: Never Smoker   • Smokeless tobacco: Never Used   Substance and Sexual Activity   • Alcohol use: No     Frequency: Never   • Drug use: No     ---------------------------------------------------------------------------------------------------------------------   Allergies:  Patient has no known allergies.  ---------------------------------------------------------------------------------------------------------------------   Prior to Admission Medications     Prescriptions Last Dose Informant Patient Reported? Taking?    apixaban (ELIQUIS) 5 MG tablet tablet   Yes No    Take 5 mg by mouth 2 (Two) Times a Day.    aspirin 81 MG chewable tablet   Yes No    Chew 81 mg Daily.    atorvastatin (LIPITOR) 40 MG tablet   Yes No    Take 20 mg by mouth Daily.    carvedilol (COREG) 25 MG tablet   Yes No    Take 25 mg by mouth 2 (Two) Times a Day With Meals.    cetirizine (zyrTEC) 10 MG tablet   Yes No    Take 10 mg by mouth Daily.    furosemide (LASIX) 20 MG tablet   Yes No    Take 20 mg by mouth 2 (Two) Times a Day.    glyBURIDE (DIAbeta) 2.5 MG tablet   Yes No    Take 2.5 mg by mouth Daily With Breakfast.    isosorbide mononitrate (IMDUR) 30 MG 24 hr tablet   Yes No    Take 30 mg by mouth Daily.    lisinopril (PRINIVIL,ZESTRIL) 20 MG tablet   Yes No    Take 20 mg by mouth Daily.    nitroglycerin (NITROSTAT) 0.4 MG SL tablet   Yes No    Place 0.4 mg under the tongue Every 5 (Five) Minutes As Needed for Chest Pain. Take no more than 3 doses in 15 minutes.    ondansetron ODT (ZOFRAN-ODT) 4 MG disintegrating tablet   No No    Take 1 tablet by mouth Every 6 (Six) Hours As Needed for Nausea.    pantoprazole (PROTONIX) 40 MG EC tablet   Yes No    Take 40 mg by mouth Daily.    Probiotic Product (DIGESTIVE ADVANTAGE PO)   Yes No    Take  by mouth.    spironolactone (ALDACTONE) 25 MG tablet   Yes No    Take  12.5 mg by mouth Daily.        ---------------------------------------------------------------------------------------------------------------------  Objective   Hospital Scheduled Meds:    aspirin 324 mg Oral Once        ---------------------------------------------------------------------------------------------------------------------   Vital Signs:  Temp:  [98.2 °F (36.8 °C)] 98.2 °F (36.8 °C)  Heart Rate:  [84] 84  Resp:  [18] 18  BP: (93)/(64) 93/64  No data found.  SpO2 Percentage    07/10/19 1004   SpO2: 97%     SpO2:  [97 %] 97 %  on   ;   Device (Oxygen Therapy): room air    Body mass index is 18.52 kg/m².  Wt Readings from Last 3 Encounters:   07/10/19 54.4 kg (120 lb)   05/05/19 63.5 kg (140 lb)   08/29/14 78.5 kg (172 lb 15.9 oz)   ---------------------------------------------------------------------------------------------------------------------   Physical Exam:  Constitutional: Chronically ill appearing, thin  male, resting comfortably in bed. No acute distress on room air.    HENT:  Head: Normocephalic and atraumatic.  Mouth: Dry with peeling skin on his lips.  Eyes:  Conjunctivae and EOM are normal. No scleral icterus. No erythema or drainage.  Neck:  Neck supple.  No JVD present. No carotid bruits noted.   Cardiovascular:  Normal rate, regular rhythm and normal heart sounds with no murmur.  Pulmonary/Chest:  No respiratory distress, no wheezes. Few crackles at the left base.  Abdominal:  Soft.  Bowel sounds are present x4.  No distension and no tenderness.   Musculoskeletal:  Thin, no edema, no tenderness, and no deformity.  No red or swollen joints anywhere.    Neurological:  Alert and oriented to person, place, and time.  No cranial nerve deficit.  No tongue deviation.  No facial droop.  No slurred speech.   Skin:  Skin is warm and dry.  No rash noted.  No pallor.   Psychiatric:  Normal mood and affect.  Behavior is normal.  Judgment and thought content normal.   Peripheral vascular:   No edema and 2+ pedal pulses bilaterally.   Genitourinary: No marie catheter in place.   ---------------------------------------------------------------------------------------------------------------------  EKG:  Pending cardiology interpretation. Per my read, reveals sinus rhythm at 65 bpm, PVC x1.  Possible old anteroseptal MI.  Nonspecific lateral T wave abnormalities.  QTc 465 ms.    Telemetry: Telemetry currently only artifact.     I have personally reviewed the EKG/Telemetry strip.  ---------------------------------------------------------------------------------------------------------------------   Results from last 7 days   Lab Units 07/10/19  1401 07/10/19  1044   TROPONIN T ng/mL <0.010 <0.010     Results from last 7 days   Lab Units 07/10/19  1044   PROBNP pg/mL 2,289.0*         Results from last 7 days   Lab Units 07/10/19  1044   WBC 10*3/mm3 9.10   HEMOGLOBIN g/dL 12.9*   HEMATOCRIT % 40.3   MCV fL 86.5   MCHC g/dL 32.0   PLATELETS 10*3/mm3 233     Results from last 7 days   Lab Units 07/10/19  1044   SODIUM mmol/L 144   POTASSIUM mmol/L 3.3*   CHLORIDE mmol/L 105   CO2 mmol/L 23.5   BUN mg/dL 14   CREATININE mg/dL 0.75*   EGFR IF NONAFRICN AM mL/min/1.73 105   CALCIUM mg/dL 9.2   GLUCOSE mg/dL 81   ALBUMIN g/dL 3.34*   BILIRUBIN mg/dL 0.9   ALK PHOS U/L 101   AST (SGOT) U/L 16   ALT (SGPT) U/L 7   Estimated Creatinine Clearance: 70.8 mL/min (A) (by C-G formula based on SCr of 0.75 mg/dL (L)).  No results found for: AMMONIA    No results found for: HGBA1C, POCGLU  No results found for: HGBA1C  No results found for: TSH, FREET4    Pain Management Panel     Pain Management Panel Latest Ref Rng & Units 7/10/2019    AMPHETAMINES SCREEN, URINE Negative Negative    BARBITURATES SCREEN Negative Negative    BENZODIAZEPINE SCREEN, URINE Negative Negative    BUPRENORPHINEUR Negative Negative    COCAINE SCREEN, URINE Negative Negative    METHADONE SCREEN, URINE Negative Negative        I have personally  reviewed the above laboratory results.   ---------------------------------------------------------------------------------------------------------------------  Imaging Results (last 7 days)     Procedure Component Value Units Date/Time    XR Abdomen 2 View With Chest 1 View [[id number]] Collected:  07/10/19 1124     Updated:  07/10/19 1127    Narrative:       EXAMINATION: XR ABDOMEN 2 VW W CHEST 1 VW-      CLINICAL INDICATION:     abd pain, SOB     TECHNIQUE:  XR ABDOMEN 2 VW W CHEST 1 VW-      COMPARISON: 05/05/2019      FINDINGS:   Stable left cardiac pacer device.  Linear atelectasis left lower lung.   Heart size, mediastinum, and pulmonary vascularity are unremarkable.   No pneumothorax.   No effusions.   No acute osseous findings.  Moderate constipation is noted. No evidence of bowel obstruction.  No pathologic calcifications identified.       Impression:       1. Left basilar atelectasis. Otherwise stable chest.  2. Moderate constipation. No bowel obstruction.        This report was finalized on 7/10/2019 11:25 AM by Dr. Alex Benavides MD.           I have personally reviewed the above radiology results.   ---------------------------------------------------------------------------------------------------------------------  Assessment & Plan      -Chest pain/Dyspnea: Vague history. Currently asymptomatic. EKG is without acute changes compared to 05/2019. He received aspirin 324 mg x 1 in the ED.  Continue aspirin 81 mg daily.  Check fasting lipid panel in a.m.  Resume home atorvastatin. No beta blocker for now secondary to baseline hypotension. Follow-up on serial troponins.  Check transthoracic echocardiogram.  I have requested the most recent discharge summary and any cardiac studies from [facility] recent admission.  I have also requested the last office visit from his primary cardiologist, Dr. Alberts.  If admission is uneventful, troponins remain negative, and echocardiogram does not show  "any new significant abnormalities, will discharge home in a.m. to follow-up with his primary cardiologist. The patient does not want a stress test unless he absolutely has to.     -Hypotension: Resolved with IV fluids.  Likely secondary to dehydration.  Hold home antihypertensive medications/diuretics for now.  Place on light IV fluids with normal saline at 50 cc/h.     -Hypokalemia: Replace orally per protocol. Check magnesium and replace if needed. Repeat BMP in AM.     -Constipation: He was disimpacted in the ED. Moderate constipation on XR with no bowel obstruction. IVF as noted above. Place on Miralax 17g daily. Sennakot 2 tablets daily. PRN dulcolax suppository.     -Advanced ischemic cardiomyopathy status post AICD implantation 4-6 years: EF reportedly 22%, no recent echocardiogram per the patient and his wife. Echo pending as noted above.  Currently appears volume depleted.  Cautious with IV fluids.     -Coronary artery disease s/p stenting x5: Complete details unavailable, last intervention reported to have been at least 2 years ago. Continue aspirin/statin. Evaluation as noted above.     -Generalized weakness/fatigue: Likely due to chronic illness and recent hospital admission. Check TSH. Continue IV fluids. Nutrition consult for malnutrition severity assessment. He has home health with PT arranged, which was to start today.     -Dyslipidemia: Continue home statin, lipid panel in a.m.    -Type 2 diabetes mellitus: Recently poor oral intake with nausea and constipation.  Glucose 81 while in the ED.  Check hemoglobin A1c level.  We will hold off on sliding scale insulin for now given poor oral intake.  Accu-Cheks before meals and at bedtime.    -Chronic anticoagulation with Eliquis: Unclear indication. Wife reports that he may have had Afib in the past, but she is unsure. She states that he had a \"blood clot in his heart\" at one point as well. Cardiology records requested.     DVT Prophylaxis: Eliquis. "     The patient is considered to be a high risk patient due to: Hypotension, Advanced cardiomyopathy, known CAD.     Code Status: FULL CODE.     I have discussed the patient's assessment and plan with the patient, his wife at bedside, and admitting physician, Dr. Early.     BRISA Blakely  07/10/19  3:16 PM  ---------------------------------------------------------------------------------------------------------------------       Electronically signed by Edwin Early MD at 7/10/2019  6:24 PM          Physician Progress Notes (last 24 hours) (Notes from 2019 12:52 PM through 2019 12:52 PM)      Bess [place], [place] at 2019  3:19 PM     Attestation signed by Edwin Early MD at 2019  5:01 PM    I have reviewed the documentation above and agree. Patient stable, echo pending, treating constipation, awaiting records from OSH.                    Patient Identification:  Name:  Sachin Rubio  Age:  63 y.o.  Sex:  male  :  1955  MRN:  [id number]  Visit Number:  [id number]  Primary Care Provider:  John Rios MD    Length of stay:  0    Chief Complaint: constipation     HPI:    Mr. Rubio is a 63 year old male patient who was admitted to the observation unit on 7/10/19 for constipation from the ED. His past medical history is significant for CAD s/p stent x5, Advanced Ischemic Cardiomyopathy s/p AICD around 4-6 years ago, DM Type 2, and dyslipidemia.     Subjective:      Mr. Rubio is sitting up in the bedside chair on my exam today, he denies any abdominal pain, no nausea or vomiting. He denies any dyspnea or chest pain. His wife is at bedside during exam. Discussed with BRANDY evangelista.   ----------------------------------------------------------------------------------------------------------------------  Current Hospital Meds:    apixaban 5 mg Oral Q12H   atorvastatin 20 mg Oral Nightly   lactulose 10 g Oral Once   magnesium sulfate 4 g Intravenous Once   polyethylene glycol  17 g Oral Daily   sennosides-docusate sodium 2 tablet Oral Nightly   sodium chloride 3 mL Intravenous Q12H       sodium chloride 50 mL/hr Last Rate: 50 mL/hr (07/11/19 1604)     ----------------------------------------------------------------------------------------------------------------------  Vital Signs:  Temp:  [98 °F (36.7 °C)-98.5 °F (36.9 °C)] 98.1 °F (36.7 °C)  Heart Rate:  [65-73] 72  Resp:  [18] 18  BP: ()/(58-65) 94/62      07/10/19  1004 07/10/19  1621   Weight: 54.4 kg (120 lb) 54.3 kg (119 lb 9.6 oz)     Body mass index is 18.73 kg/m².    Intake/Output Summary (Last 24 hours) at 7/11/2019 1627  Last data filed at 7/11/2019 0900  Gross per 24 hour   Intake 0 ml   Output --   Net 0 ml     No intake/output data recorded.  Diet Regular; Cardiac  ----------------------------------------------------------------------------------------------------------------------  Physical exam:  Constitutional:  Pale, thin chronically ill appearing male.   HENT:  Head:  Normocephalic and atraumatic.  Mouth:  Moist mucous membranes.    Eyes:Pupils are equal, round, and reactive to light.  No scleral icterus.    Neck:  Neck supple.  No JVD present.    Cardiovascular:  Normal rate, regular rhythm and normal heart sounds with no murmur.  Pulmonary/Chest:  No respiratory distress, no wheezes, no crackles, with normal breath sounds and good air movement.  Abdominal:  Soft.  Bowel sounds are normal.  No distension and no tenderness.   Musculoskeletal:  No edema, no tenderness, and no deformity.  No red or swollen joints anywhere.    Neurological:  Alert and oriented to person, place, and time.   No tongue deviation.  No facial droop.  No slurred speech.   Skin:  Skin is warm and dry. No rash noted. No pallor.   ----------------------------------------------------------------------------------------------------------------------  Tele:         ----------------------------------------------------------------------------------------------------------------------  Results from last 7 days   Lab Units 07/10/19  1401 07/10/19  1044   TROPONIN T ng/mL <0.010 <0.010   PROBNP pg/mL  --  2,289.0*     Results from last 7 days   Lab Units 07/11/19  0131 07/10/19  1044   WBC 10*3/mm3 8.63 9.10   HEMOGLOBIN g/dL 11.4* 12.9*   HEMATOCRIT % 36.1* 40.3   MCV fL 87.8 86.5   MCHC g/dL 31.6 32.0   PLATELETS 10*3/mm3 188 233         Results from last 7 days   Lab Units 07/11/19  0131 07/10/19  1401 07/10/19  1044   SODIUM mmol/L 141  --  144   POTASSIUM mmol/L 3.8  --  3.3*   MAGNESIUM mg/dL 1.7 1.8  --    CHLORIDE mmol/L 108*  --  105   CO2 mmol/L 22.1  --  23.5   BUN mg/dL 13  --  14   CREATININE mg/dL 0.53*  --  0.75*   EGFR IF NONAFRICN AM mL/min/1.73 >150  --  105   CALCIUM mg/dL 8.5*  --  9.2   GLUCOSE mg/dL 72  --  81   ALBUMIN g/dL  --   --  3.34*   BILIRUBIN mg/dL  --   --  0.9   ALK PHOS U/L  --   --  101   AST (SGOT) U/L  --   --  16   ALT (SGPT) U/L  --   --  7   Estimated Creatinine Clearance: 70.7 mL/min (A) (by C-G formula based on SCr of 0.53 mg/dL (L)).  No results found for: AMMONIA  Results from last 7 days   Lab Units 07/11/19 0131   CHOLESTEROL mg/dL 141   TRIGLYCERIDES mg/dL 106   HDL CHOL mg/dL 28*   LDL CHOL mg/dL 92                 ----------------------------------------------------------------------------------------------------------------------  Imaging Results (last 24 hours)     ** No results found for the last 24 hours. **        ----------------------------------------------------------------------------------------------------------------------  Assessment and Plan:    Chest pain/Dyspnea: denies any cp or dyspnea on exam today, in no distress. Troponin trend was flat. Echo pending. Continue to monitor on telemetry. Awaiting DC summary from [facility]    Hypotension: BP remains borderline at 94/62, continue IV fluids and monitor fluid status  closely. No BP meds being given.     Hypokalemia: resolved, potassium is 3.8.     Hypomagnesemia: mag level is 1.7, replacement ordered per protocol, repeat in the am.     Constipation: no bowel movement since disimpaction in the ED. Continue miralax and senna. Lactulose x1 PO ordered. Denies any abdominal pain, abdomen remains soft and non-tender with bowel sounds. Patient reports his last BM was 2-3 days prior to admission. He does have a colonoscopy scheduled with Dr. Herman on 7/31/19 for his history of polyps.     CAD s/p stent, Advanced ischemic Cardiomyopathy s/p AICD: repeat echo done here and read is pending, trop trend negative, follows with cardiology at [facility]. Monitor on telemetry, denies any current chest pain or dyspnea. Reported echo is apparently around 22%, awaiting echo reading, does not appear in fluid overload at this time. BP currently to low for ARB/ACE and beta blocker.     DM Type 2, non-insulin dependent: hypoglycemia protocol initiated, accu checks ac/hs and prn, diabetic diet, no sliding scale ordered at this time.      Chronic Anticoagulation: on eliquis at home, awaiting records from [facility].    Urinary retention: bladder scan at 1pm showed 210ml, repeat is pending, if >300ml of urine on scan will straight cath and repeat bladder scan in 4 hours.     Functional decline/Weakness: PT/OT ordered. Wife reports functional decline over the last 2 months. They are set up with PT home health per his PCP.     DVT prophylaxis: on eliquis PO        OH Eckert  07/11/19  4:27 PM        Electronically signed by Edwin Early MD at 7/11/2019  5:01 PM       Physical Therapy Notes (most recent note)     No notes of this type exist for this encounter.        Occupational Therapy Notes (most recent note)     No notes of this type exist for this encounter.           Discharge Summary      Bess Burns APRN at 7/12/2019 12:01 PM              [facility]    Patient Identification:  Name:  Sachin Rubio  Age:  63 y.o.  Sex:  male  :  1955  MRN:  [id number]  Visit Number:  [id number]    Date of Admission: 7/10/2019  Date of Discharge:  2019     PCP: John Rios MD    DISCHARGE DIAGNOSIS    Chest pain/dyspnea, resolved   HTN with hypotension, resolved   Hypokalemia, resolved   Hypomagnesemia, resolved   Constipation, resolved   CAD s/p stenting   Advanced Ischemic Cardiomyopathy s/p AICD 4-6 years ago, EF 35%  Mild aortic valve stenosis  DM Type 2, non-insulin dependent   Left ventricular apical thrombus   Functional Decline     CONSULTS   none    PROCEDURES PERFORMED        HPI:     Mr. Rubio is a 63 year old male patient who was admitted to the observation unit on 7/10/19 for constipation from the ED. His past medical history is significant for CAD s/p stent x5, Advanced Ischemic Cardiomyopathy s/p AICD around 4-6 years ago, EF of 35%,  Moderate aortic stenosis, left ventricular apical thrombus, DM Type 2 non-insulin dependent, and dyslipidemia.       HOSPITAL COURSE    Mr. Rubio was admitted to the observation unit, troponin T was trended and remained <0.010.  Chest x-ray and KUB was done on admission which showed left basilar atelectasis, moderate constipation with no bowel obstruction.  He was admitted and started on senna at bedtime and MiraLAX daily, the day after admission he had not yet to have a bowel movement, he was given 1 dose of lactulose and has had bowel movements prior to discharge.  Echocardiogram was also done due to his complaint of chest pain, and showed EF of 35%, full report as noted above.  His discharge summary from [facility] from his stay 2019 through 2019 was reviewed, his most recent EF there was noted to be around 40 to 45%, ours is slightly lower however this could be discrepancy per read, he does have an AICD. He did have some urinary retention yesterday and required a one time straight  cath but since having bowel movements he has been able to void on his own. He did walk the halls today with PT and did well with use of walker. His appetite has improved and able to tolerate PO intake/diet.    Discharge:     Mr. Rubio will be discharged home today with his wife.  Bowel regimen discussed with him and his wife.  I have encouraged him to keep his appointment with Dr. Herman on 7/31/2019 for his colonoscopy.  We will also have the staff set him appointment up with his cardiologist  within the next week. I have asked him and his wife to closely monitor his blood pressure 2-3 times a day. He will not be sent home on any ACE/ARB or aldactone due to his borderline hypotension, which will reuqire further follow up and monitoring by his PCP/Cardiologist. Reasons that would warrant a return to the ED discussed. He and his wife both feel he is ready for discharge today. He does have PT home health and nursing care visits set up. He has also had two episodes of slightly lower glucoses, his wife reports he does have a glucometer and supplies at home to check his sugar, I have asked him to do this 2-3 times a day and hypoglycemia treatment discussed.  He will not be sent home on any antidiabetic medications.      VITAL SIGNS:  Temp:  [97.2 °F (36.2 °C)-98.6 °F (37 °C)] 98.6 °F (37 °C)  Heart Rate:  [65-81] 77  Resp:  [18] 18  BP: ()/(57-74) 105/57  SpO2:  [94 %-99 %] 95 %  on   ;   Device (Oxygen Therapy): room air    Body mass index is 18.73 kg/m².  Wt Readings from Last 3 Encounters:   07/10/19 54.3 kg (119 lb 9.6 oz)   05/05/19 63.5 kg (140 lb)   08/29/14 78.5 kg (172 lb 15.9 oz)       PHYSICAL EXAM:    Constitutional:  Pale, thin chronically ill appearing male.   HENT:  Head:  Normocephalic and atraumatic.  Mouth:  Moist mucous membranes.    Eyes:Pupils are equal, round, and reactive to light.  No scleral icterus.    Neck:  Neck supple.  No JVD present.    Cardiovascular:  Normal rate, regular  rhythm and normal heart sounds with no murmur.  Pulmonary/Chest:  No respiratory distress, no wheezes, no crackles, with normal breath sounds and good air movement.  Abdominal:  Soft.  Bowel sounds are normal.  No distension and no tenderness.   Musculoskeletal:  No edema, no tenderness, and no deformity.  No red or swollen joints anywhere.    Neurological:  Alert and oriented to person, place, and time.   No tongue deviation.  No facial droop.  No slurred speech.   Skin:  Skin is warm and dry. No rash noted. No pallor.     DISCHARGE DISPOSITION   Stable    DISCHARGE MEDICATIONS:     Discharge Medications      New Medications      Instructions Start Date   atorvastatin 20 MG tablet  Commonly known as:  LIPITOR   20 mg, Oral, Nightly      polyethylene glycol pack packet  Commonly known as:  MIRALAX   17 g, Oral, Daily   Start Date:  7/13/2019     sennosides-docusate sodium 8.6-50 MG tablet  Commonly known as:  SENOKOT-S   2 tablets, Oral, Nightly         Continue These Medications      Instructions Start Date   apixaban 5 MG tablet tablet  Commonly known as:  ELIQUIS   5 mg, Oral, 2 Times Daily         Stop These Medications    carvedilol 3.125 MG tablet  Commonly known as:  COREG     lisinopril 5 MG tablet  Commonly known as:  PRINIVIL,ZESTRIL            Diet Instructions     Diet: Regular, Cardiac      Discharge Diet:   Regular  Cardiac           Additional Instructions for the Follow-ups that You Need to Schedule     Discharge Follow-up with PCP   As directed       Currently Documented PCP:    John Rios MD    PCP Phone Number:    [phone]     Follow Up Details:  2-3 days, repeat CBC, BMP         Discharge Follow-up with Specified Provider: Dr. Alberts ([place] [facility]) 2-3 days   As directed      To:  Dr. Alberts ([place] [facility]) 2-3 days           Follow-up Information     John Rios MD .    Specialty:  Family Medicine  Why:  2-3 days, repeat CBC, BMP  Contact information:  [address]  [facility] [address]  [phone]                    TEST  RESULTS PENDING AT DISCHARGE  none     CODE STATUS  Code Status and Medical Interventions:   Ordered at: 07/10/19 1515     Code Status:    CPR     Medical Interventions (Level of Support Prior to Arrest):    Full       OH Eckert  [facility] Hospitalist  07/12/19  12:49 PM    Please note that this discharge summary required more than 30 minutes to complete.      Electronically signed by Bess Burns APRN at 7/12/2019 12:50 PM       Discharge Order (From admission, onward)    Start     Ordered    07/12/19 1226  Discharge patient  Once     Expected Discharge Date:  07/12/19    Discharge Disposition:  Home or Self Care    Physician of Record for Attribution - Please select from Treatment Team:  OJ JAMES [[id number]]    Review needed by CMO to determine Physician of Record:  No       Question Answer Comment   Physician of Record for Attribution - Please select from Treatment Team OJ JAMES    Review needed by CMO to determine Physician of Record No        07/12/19 1231              Sachin Rubio MRN: [id number]      7/10/2019 - 7/12/2019     [facility] OBSERVATION UNIT    After Visit Summary   Instructions     ·   Your Care at Home     Walker   ·   Your medications have changed     START taking:   ? atorvastatin (LIPITOR)   ? polyethylene glycol (MIRALAX)   Start taking on: 7/13/2019   ? sennosides-docusate sodium (SENOKOT-S)   STOP taking:   ? carvedilol 3.125 MG tablet (COREG)   ? lisinopril 5 MG tablet (PRINIVIL,ZESTRIL)   Review your updated medication list below.   Your Next Steps     Ask     ·   Ask how to get these   ? polyethylene glycol   Do     ·    these medications from [facility], [place] - [address] - [phone]  - [phone] FX   ? atorvastatin   ? sennosides-docusate sodium   If you have any questions about your recovery, please call the [facility] Nurse  Call Center at [phone]. A registered nurse is available 24 hours a day 7 days a week to assist you.   ·   Diet instructions     Diet: Regular, Cardiac   Discharge Diet:   Regular  Cardiac   ·   Other instructions     Discharge Follow-up with PCP   Currently Documented PCP:   John Rios MD   PCP Phone Number:   [phone]   Discharge Follow-up with Specified Provider: Dr. Alberts ([place] [facility]) 2-3 days   Walker   What's next     What's next          Follow up with John Rios MD   2-3 days, repeat CBC, BMP  [address] [facility] [address]   [phone]    ·   Additional Information     Follow-up with Gabriel on 19 @10:00AM  Follow-up with Dr. Alberts on 19 @10:30am   Your Allergies   Date Reviewed: 7/10/2019   Your Allergies   No active allergies   Patient Belongings Returned     Document Return of Belongings Flowsheet     Were the patient bedside belongings sent home? --   Medications Retrieved from Pharmacy & Sent Home --   Belongings Sent to Safe --   Belongings sent with: --   Belongings Retrieved from Security & Sent Home --       [employer] Signup     [employer] allows you to send messages to your doctor, view your test results, renew your prescriptions, schedule appointments, and more. To sign up, go to [employer] and click on the Sign Up Now link in the New User? box. Enter your biNu Activation Code exactly as it appears below along with the last four digits of your Social Security Number and your Date of Birth () to complete the sign-up process. If you do not sign up before the expiration date, you must request a new code.     biNu Activation Code: [email or web address]  Expires: 8/10/2019  8:40 AM     If you have questions, you can email [email or web address] or call [phone] to talk to our biNu staff. Remember, biNu is NOT to be used for urgent needs. For medical emergencies, dial 911.     Medication List   Medication List      "Morning Afternoon Evening Bedtime As Needed    apixaban 5 MG tablet tablet   Commonly known as: ELIQUIS   Take 5 mg by mouth 2 (Two) Times a Day.   Last time this was given: 5 mg on 7/12/2019  9:14 AM          atorvastatin 20 MG tablet   Commonly known as: LIPITOR   Take 1 tablet by mouth Every Night.   Last time this was given: 20 mg on 7/11/2019  8:22 PM          polyethylene glycol pack packet   Commonly known as: MIRALAX   Start taking on: 7/13/2019   Take 17 g by mouth Daily.   Last time this was given: 17 g on 7/11/2019  9:47 AM          sennosides-docusate sodium 8.6-50 MG tablet   Commonly known as: SENOKOT-S   Take 2 tablets by mouth Every Night.   Last time this was given: 2 tablets on 7/11/2019  8:22 PM         Where to  your medications     ·    these medications at [address] - [phone] [place] [phone] FX     ? atorvastatin • sennosides-docusate sodium   Address: [facility] [address] [address]   Phone: [phone]   ·   Ask your doctor where to pick these up     ? • polyethylene glycol pack packet   Always carry an updated list of your medications with you. If there is an emergency, a responder can quickly see what medications you are taking. Take this paperwork with you the next time you see your health care provider.        [employer] Sign-Up     Send messages to your doctor, view your test results, renew your prescriptions, schedule appointments, and more.   Go to [email or web address], click \"Sign Up Now\", and enter your personal activation code: [email or web address]. Activation code expires 8/10/2019.   Pneumonia Vaccination     Please follow up with your primary care provider or retail pharmacy to see if you are eligible for a pneumonia vaccination.        Opioid Resource     If you or someone you know needs information on substance abuse, please visit   https://www.findhelpnowky.org/ for listings of facilities " and resources across [place].  Stroke Symptoms     · Call 911 or have someone take you to the Emergency Department if you have any of the following:  · Sudden numbness or weakness of your face, arm or leg especially on one side of the body  · Sudden confusion, difficulty speaking or trouble understanding   · Changes in your vision or loss of sight in one eye  · Sudden severe headache with no known cause  · Sudden dizziness, trouble walking, loss of balance or coordination     It is important to seek emergency care right away if you have further stroke symptoms. If you get emergency help quickly, the powerful clot-dissolving medicines can reduce the disabilities caused by a stroke.      For more information:  American Stroke Association  0-568-1-STROKE  www.strokeassociation.org  Smoking Cessation     IF YOU SMOKE OR USE TOBACCO PLEASE READ THE FOLLOWING:  Why is smoking bad for me?  Smoking increases the risk of heart disease, lung disease, vascular disease, stroke, and cancer. If you smoke, STOP!     If you would like more information on quitting smoking, please visit the [employer] website: [email or web address]   This link will provide additional resources including the QUIT line and the Beat the Pack support groups.      For more information:  Quit Now [place]  1-800-QUIT-NOW  https://[place].quitlogix.org/en-[place]/  Suicidal Feelings     If you feel like life is too tough and are thinking of suicide or injuring yourself, get help right away!  · Call 911  · Call a suicide hotline to speak to a counselor. 2-460-566-TALK or 9-000-YRUWEWF   Patient Experience     Thank you for choosing [employer] [facility]. You may receive a survey following your visit. Please take a moment to share what went well, where we need improvement, and which staff members deserve recognition. We value your input.           YOU ARE THE MOST IMPORTANT FACTOR IN YOUR RECOVERY.      Follow all  "instructions carefully.      I have reviewed my discharge instructions with my nurse, including the following information, if applicable:                 Information about my illness and diagnosis              Follow up appointments (including lab draws)              Wound Care              Equipment Needs              Medications (new and continuing) along with side effects              Preventative information such as vaccines and smoking cessations              Diet              Pain              I know when to contact my Doctor's office or seek emergency care        I want my nurse to describe the side effects of my medications: YES NO   If the answer is no, I understand the side effects of my medications: YES NO   My nurse described the side effects of my medications in a way that I could understand: YES NO   I have taken my personal belongings and my own medications with me at discharge: YES NO       I have received this information and my questions have been answered. I have discussed any concerns I see with this plan with the nurse or physician. I understand these instructions.     Signature of Patient or Responsible Person: _____________________________________     Date: _________________  Time: __________________     Signature of Healthcare Provider: _______________________________________  Date: _________________  Time: __________________        [facility] OBSERVATION UNIT  [address] [facility] [address]  Dept. Phone:  [phone]  Dept. Fax:   Date Ordered: 2019         Patient:  Sachin Rubio MRN:  [id number]   [address] [facility] [address] :  1955  SSN:    Phone: [phone] Sex:  M     Weight: 54.3 kg (119 lb 9.6 oz)         Ht Readings from Last 1 Encounters:   07/10/19 170.2 cm (67\")         Arik               (Order ID: [id number])    Diagnosis:  Debility (R53.81 [ICD-10-CM] 799.3 [ICD-9-CM])   Quantity:  1     Equipment:  Walker Folding with " Wheels  Length of Need (99 Months = Lifetime): 99 Months = Lifetime        Authorizing Provider's Phone: [phone]   Authorizing Provider:Bess Burns APRN  Authorizing Provider's NPI: [id number]  Order Entered By: Bess Burns APRN 7/12/2019 12:31 PM     Electronically signed by: Bess Burns APRN 7/12/2019 12:31 PM

## 2022-05-12 ENCOUNTER — HOSPITAL ENCOUNTER (EMERGENCY)
Dept: HOSPITAL 79 - ER1 | Age: 67
Discharge: ANOTHER HOSPITAL | End: 2022-05-12
Payer: MEDICARE

## 2022-05-12 DIAGNOSIS — I25.2: ICD-10-CM

## 2022-05-12 DIAGNOSIS — G45.9: ICD-10-CM

## 2022-05-12 DIAGNOSIS — D69.6: ICD-10-CM

## 2022-05-12 DIAGNOSIS — E11.9: ICD-10-CM

## 2022-05-12 DIAGNOSIS — Z20.822: ICD-10-CM

## 2022-05-12 DIAGNOSIS — I10: ICD-10-CM

## 2022-05-12 DIAGNOSIS — I63.9: Primary | ICD-10-CM

## 2022-05-12 LAB
BUN/CREATININE RATIO: 32 (ref 0–10)
HGB BLD-MCNC: 14.2 GM/DL (ref 14–17.5)
RED BLOOD COUNT: 4.82 M/UL (ref 4.2–5.5)
WHITE BLOOD COUNT: 6.7 K/UL (ref 4.5–11)

## 2022-05-12 PROCEDURE — U0002 COVID-19 LAB TEST NON-CDC: HCPCS

## 2022-08-11 ENCOUNTER — HOSPITAL ENCOUNTER (OUTPATIENT)
Dept: HOSPITAL 79 - HEART 5 | Age: 67
End: 2022-08-11
Attending: INTERNAL MEDICINE
Payer: MEDICARE

## 2022-08-11 DIAGNOSIS — I11.0: ICD-10-CM

## 2022-08-11 DIAGNOSIS — I25.5: ICD-10-CM

## 2022-08-11 DIAGNOSIS — I08.3: ICD-10-CM

## 2022-08-11 DIAGNOSIS — I50.22: ICD-10-CM

## 2022-08-11 DIAGNOSIS — I21.3: Primary | ICD-10-CM

## 2022-08-11 DIAGNOSIS — I25.10: ICD-10-CM

## 2022-08-19 ENCOUNTER — HOSPITAL ENCOUNTER (OUTPATIENT)
Dept: HOSPITAL 79 - LAB | Age: 67
End: 2022-08-19
Attending: INTERNAL MEDICINE
Payer: MEDICARE

## 2022-08-19 DIAGNOSIS — I50.22: ICD-10-CM

## 2022-08-19 DIAGNOSIS — Z45.02: Primary | ICD-10-CM

## 2022-08-19 DIAGNOSIS — I25.10: ICD-10-CM

## 2022-08-19 DIAGNOSIS — I25.5: ICD-10-CM

## 2022-08-19 LAB
BUN/CREATININE RATIO: 21 (ref 0–10)
HGB BLD-MCNC: 14.4 GM/DL (ref 14–17.5)
RED BLOOD COUNT: 4.82 M/UL (ref 4.2–5.5)
WHITE BLOOD COUNT: 7.5 K/UL (ref 4.5–11)

## 2022-08-23 ENCOUNTER — HOSPITAL ENCOUNTER (OUTPATIENT)
Dept: HOSPITAL 79 - CATH | Age: 67
End: 2022-08-23
Attending: INTERNAL MEDICINE
Payer: MEDICARE

## 2022-08-23 DIAGNOSIS — I25.5: ICD-10-CM

## 2022-08-23 DIAGNOSIS — Z86.73: ICD-10-CM

## 2022-08-23 DIAGNOSIS — E11.9: ICD-10-CM

## 2022-08-23 DIAGNOSIS — Z79.01: ICD-10-CM

## 2022-08-23 DIAGNOSIS — Z88.8: ICD-10-CM

## 2022-08-23 DIAGNOSIS — I25.2: ICD-10-CM

## 2022-08-23 DIAGNOSIS — Z95.5: ICD-10-CM

## 2022-08-23 DIAGNOSIS — Z79.899: ICD-10-CM

## 2022-08-23 DIAGNOSIS — I48.0: ICD-10-CM

## 2022-08-23 DIAGNOSIS — I25.10: ICD-10-CM

## 2022-08-23 DIAGNOSIS — Z45.02: Primary | ICD-10-CM

## 2022-08-23 DIAGNOSIS — I11.0: ICD-10-CM

## 2022-08-23 DIAGNOSIS — I50.22: ICD-10-CM

## 2022-08-23 PROCEDURE — C1721 AICD, DUAL CHAMBER: HCPCS
